# Patient Record
Sex: MALE | Race: WHITE | NOT HISPANIC OR LATINO | Employment: PART TIME | URBAN - METROPOLITAN AREA
[De-identification: names, ages, dates, MRNs, and addresses within clinical notes are randomized per-mention and may not be internally consistent; named-entity substitution may affect disease eponyms.]

---

## 2023-05-09 ENCOUNTER — OFFICE VISIT (OUTPATIENT)
Dept: PRIMARY CARE | Facility: CLINIC | Age: 68
End: 2023-05-09
Payer: COMMERCIAL

## 2023-05-09 VITALS
WEIGHT: 156 LBS | DIASTOLIC BLOOD PRESSURE: 71 MMHG | HEIGHT: 71 IN | BODY MASS INDEX: 21.84 KG/M2 | SYSTOLIC BLOOD PRESSURE: 112 MMHG | HEART RATE: 63 BPM | OXYGEN SATURATION: 97 % | TEMPERATURE: 97.1 F

## 2023-05-09 DIAGNOSIS — K21.9 GASTROESOPHAGEAL REFLUX DISEASE, UNSPECIFIED WHETHER ESOPHAGITIS PRESENT: ICD-10-CM

## 2023-05-09 DIAGNOSIS — L65.9 HAIR LOSS: ICD-10-CM

## 2023-05-09 DIAGNOSIS — Z00.00 HEALTHCARE MAINTENANCE: ICD-10-CM

## 2023-05-09 DIAGNOSIS — R53.83 OTHER FATIGUE: ICD-10-CM

## 2023-05-09 DIAGNOSIS — E55.9 VITAMIN D DEFICIENCY: ICD-10-CM

## 2023-05-09 DIAGNOSIS — Z87.898: ICD-10-CM

## 2023-05-09 DIAGNOSIS — R94.31 ABNORMAL EKG: ICD-10-CM

## 2023-05-09 DIAGNOSIS — K13.0 CHAPPED LIPS: ICD-10-CM

## 2023-05-09 DIAGNOSIS — F41.9 ANXIETY: Primary | ICD-10-CM

## 2023-05-09 DIAGNOSIS — R00.1 BRADYCARDIA: ICD-10-CM

## 2023-05-09 DIAGNOSIS — Z12.5 PROSTATE CANCER SCREENING: ICD-10-CM

## 2023-05-09 DIAGNOSIS — Z91.09 ENVIRONMENTAL ALLERGIES: ICD-10-CM

## 2023-05-09 PROCEDURE — 1159F MED LIST DOCD IN RCRD: CPT | Performed by: STUDENT IN AN ORGANIZED HEALTH CARE EDUCATION/TRAINING PROGRAM

## 2023-05-09 PROCEDURE — 1160F RVW MEDS BY RX/DR IN RCRD: CPT | Performed by: STUDENT IN AN ORGANIZED HEALTH CARE EDUCATION/TRAINING PROGRAM

## 2023-05-09 PROCEDURE — 1036F TOBACCO NON-USER: CPT | Performed by: STUDENT IN AN ORGANIZED HEALTH CARE EDUCATION/TRAINING PROGRAM

## 2023-05-09 PROCEDURE — 99204 OFFICE O/P NEW MOD 45 MIN: CPT | Performed by: STUDENT IN AN ORGANIZED HEALTH CARE EDUCATION/TRAINING PROGRAM

## 2023-05-09 RX ORDER — BUSPIRONE HYDROCHLORIDE 5 MG/1
5 TABLET ORAL 2 TIMES DAILY
Qty: 180 TABLET | Refills: 0 | Status: SHIPPED | OUTPATIENT
Start: 2023-05-09 | End: 2023-06-06 | Stop reason: DRUGHIGH

## 2023-05-09 RX ORDER — FINASTERIDE 1 MG/1
TABLET, FILM COATED ORAL EVERY 24 HOURS
COMMUNITY
End: 2023-07-11 | Stop reason: SDUPTHER

## 2023-05-09 RX ORDER — LORATADINE 10 MG/1
10 TABLET ORAL DAILY
Qty: 30 TABLET | Refills: 2 | COMMUNITY
Start: 2022-06-14 | End: 2022-09-12 | Stop reason: WASHOUT

## 2023-05-09 RX ORDER — BUSPIRONE HYDROCHLORIDE 5 MG/1
TABLET ORAL 2 TIMES DAILY
COMMUNITY
End: 2023-05-09 | Stop reason: SDUPTHER

## 2023-05-09 RX ORDER — PETROLATUM,WHITE
OINTMENT IN PACKET (GRAM) TOPICAL AS NEEDED
Qty: 106 G | Refills: 1 | Status: SHIPPED | OUTPATIENT
Start: 2023-05-09 | End: 2024-04-16 | Stop reason: WASHOUT

## 2023-05-09 ASSESSMENT — ENCOUNTER SYMPTOMS
FEVER: 0
PALPITATIONS: 0
CHILLS: 0
VOMITING: 0
FATIGUE: 0
CONSTIPATION: 0
MYALGIAS: 0
COUGH: 0
SHORTNESS OF BREATH: 0
DIARRHEA: 0
FREQUENCY: 0
HEADACHES: 0
LIGHT-HEADEDNESS: 0
ABDOMINAL PAIN: 0
NAUSEA: 0
RHINORRHEA: 0
ARTHRALGIAS: 0
DIZZINESS: 0

## 2023-05-09 NOTE — PROGRESS NOTES
"Subjective   Patient ID: Wes Sharif is a 67 y.o. male who presents for Establish Care (stress).    HPI     He is here to establish care.  About 3 months ago he started having a lot of increased stress in addition to working multiple jobs.  He had some pain in his chest and decided to get checked out.  He saw another physician. He did not think that there was anything wrong with his heart. EKG normal.  They thought it was a muscle problem along with anxiety.  They thought he also had some anxiety and was started on buspirone.  He has a history of gastric reflux.  He denies any pain currently or any more chest pain.   Sometimes he still feels a heaviness in his chest with increased stress.  He feels better with walking.    He had been seeing a Dr. Katlyn Pelletier with [facility].        Review of Systems   Constitutional:  Negative for chills, fatigue and fever.   HENT:  Negative for congestion and rhinorrhea.    Respiratory:  Negative for cough and shortness of breath.    Cardiovascular:  Negative for chest pain and palpitations.   Gastrointestinal:  Negative for abdominal pain, constipation, diarrhea, nausea and vomiting.   Genitourinary:  Negative for frequency and urgency.   Musculoskeletal:  Negative for arthralgias and myalgias.   Allergic/Immunologic: Positive for environmental allergies. Negative for food allergies.   Neurological:  Negative for dizziness, light-headedness and headaches.       Objective   /71   Pulse 63   Temp 36.2 °C (97.1 °F)   Ht 1.797 m (5' 10.75\")   Wt 70.8 kg (156 lb)   SpO2 97%   BMI 21.91 kg/m²     Physical Exam  Vitals and nursing note reviewed.   Constitutional:       General: He is not in acute distress.     Appearance: Normal appearance. He is normal weight. He is not ill-appearing or toxic-appearing.   Cardiovascular:      Rate and Rhythm: Normal rate and regular rhythm.      Heart sounds: Normal heart sounds.   Pulmonary:      Effort: Pulmonary effort is " normal.      Breath sounds: Normal breath sounds.   Abdominal:      General: Abdomen is flat. Bowel sounds are normal.      Palpations: Abdomen is soft.   Neurological:      Mental Status: He is alert.         Assessment/Plan   Problem List Items Addressed This Visit          Digestive    Gastroesophageal reflux disease    Relevant Orders    CBC and Auto Differential       Other    Anxiety - Primary    Relevant Medications    busPIRone (Buspar) 5 mg tablet    Other Relevant Orders    CBC and Auto Differential    TSH with reflex to Free T4 if abnormal    Vitamin D, Total    Comprehensive Metabolic Panel    Stress Test Only    Hair loss    Relevant Orders    CBC and Auto Differential    Vitamin D, Total    Environmental allergies     Other Visit Diagnoses       Healthcare maintenance        Relevant Medications    busPIRone (Buspar) 5 mg tablet    Other Relevant Orders    CBC and Auto Differential    Prostate Specific Antigen    TSH with reflex to Free T4 if abnormal    Urinalysis with Reflex Microscopic    Vitamin D, Total    Comprehensive Metabolic Panel    Prostate cancer screening        Relevant Orders    Prostate Specific Antigen    History of dull chest pain        Relevant Orders    Stress Test Only    Other fatigue        Relevant Orders    Vitamin D, Total    Stress Test Only    Vitamin D deficiency        Relevant Orders    Vitamin D, Total    Bradycardia        Relevant Orders    Stress Test Only    Abnormal EKG        Relevant Orders    Stress Test Only    Chapped lips        Relevant Medications    white petrolatum (Vaseline) ointment          Patient presenting to establish care.  We will obtain records from prior medical office that he was cared for.  Ordered lab work that the patient can get done prior to his next upcoming appointment.  Reordered patient's buspirone for anxiety and he can continue this current medication.  Also discussed what he can use for chapped lips.  Patient with nonspecific  chest pain.  He denies any current symptoms now.  He had been in the ER with initial cardiac work-up being negative.  Given age and other symptoms, will order stress testing.  We will follow-up for wellness exam after stress testing is performed.  Discussed signs and symptoms that would require reevaluation in the office versus immediate treatment in the emergency department.  He is understanding and in agreement with this plan.

## 2023-05-09 NOTE — PATIENT INSTRUCTIONS
Schedule a medicare wellness exam to be done after your stress test. You will need to call to set this up to be done about 4-5 days after the stress test.  Get labs done before your medicare wellness visit. You will need to fast for 10-12 hours beforehand. You can have water.   Have the records faxed over from [facility]. Or if you pick them up you can have the office make copies to have given to me.  Call with any questions or concerns.    Can also use an over the counter chapstick

## 2023-05-23 ENCOUNTER — TELEPHONE (OUTPATIENT)
Dept: PRIMARY CARE | Facility: CLINIC | Age: 68
End: 2023-05-23
Payer: COMMERCIAL

## 2023-05-23 NOTE — TELEPHONE ENCOUNTER
Pt called to inquire about his stress test results and states you mentioned him also having an Ultrasound and wanted to know about that.    Wes # [phone]

## 2023-05-25 ENCOUNTER — TELEPHONE (OUTPATIENT)
Dept: PRIMARY CARE | Facility: CLINIC | Age: 68
End: 2023-05-25
Payer: COMMERCIAL

## 2023-05-25 DIAGNOSIS — J30.2 SEASONAL ALLERGIES: Primary | ICD-10-CM

## 2023-05-26 RX ORDER — FLUTICASONE PROPIONATE 50 MCG
1 SPRAY, SUSPENSION (ML) NASAL DAILY
Qty: 16 G | Refills: 1 | Status: SHIPPED | OUTPATIENT
Start: 2023-05-26 | End: 2023-11-13 | Stop reason: SDUPTHER

## 2023-05-30 ENCOUNTER — TELEPHONE (OUTPATIENT)
Dept: PRIMARY CARE | Facility: CLINIC | Age: 68
End: 2023-05-30
Payer: COMMERCIAL

## 2023-05-30 NOTE — TELEPHONE ENCOUNTER
Patient calling to verify his buspirone medication, pt states he was previously taking two 15mg tablet daily; you had prescribed him to take two 5mg tablets daily; do you want pt to decrease this medication from 30mg a day to 10 mg a day    Please advise pt at [phone]

## 2023-06-05 ENCOUNTER — LAB (OUTPATIENT)
Dept: LAB | Facility: LAB | Age: 68
End: 2023-06-05
Payer: COMMERCIAL

## 2023-06-05 DIAGNOSIS — K21.9 GASTROESOPHAGEAL REFLUX DISEASE, UNSPECIFIED WHETHER ESOPHAGITIS PRESENT: ICD-10-CM

## 2023-06-05 DIAGNOSIS — E55.9 VITAMIN D DEFICIENCY: ICD-10-CM

## 2023-06-05 DIAGNOSIS — F41.9 ANXIETY: ICD-10-CM

## 2023-06-05 DIAGNOSIS — Z00.00 HEALTHCARE MAINTENANCE: ICD-10-CM

## 2023-06-05 DIAGNOSIS — L65.9 HAIR LOSS: ICD-10-CM

## 2023-06-05 DIAGNOSIS — Z12.5 PROSTATE CANCER SCREENING: ICD-10-CM

## 2023-06-05 DIAGNOSIS — R53.83 OTHER FATIGUE: ICD-10-CM

## 2023-06-05 LAB
ALANINE AMINOTRANSFERASE (SGPT) (U/L) IN SER/PLAS: 27 U/L (ref 10–52)
ALBUMIN (G/DL) IN SER/PLAS: 4.5 G/DL (ref 3.4–5)
ALKALINE PHOSPHATASE (U/L) IN SER/PLAS: 62 U/L (ref 33–136)
ANION GAP IN SER/PLAS: 10 MMOL/L (ref 10–20)
APPEARANCE, URINE: CLEAR
ASPARTATE AMINOTRANSFERASE (SGOT) (U/L) IN SER/PLAS: 24 U/L (ref 9–39)
BASOPHILS (10*3/UL) IN BLOOD BY AUTOMATED COUNT: 0.02 X10E9/L (ref 0–0.1)
BASOPHILS/100 LEUKOCYTES IN BLOOD BY AUTOMATED COUNT: 0.3 % (ref 0–2)
BILIRUBIN TOTAL (MG/DL) IN SER/PLAS: 1 MG/DL (ref 0–1.2)
BILIRUBIN, URINE: NEGATIVE
BLOOD, URINE: NEGATIVE
CALCIDIOL (25 OH VITAMIN D3) (NG/ML) IN SER/PLAS: 34 NG/ML
CALCIUM (MG/DL) IN SER/PLAS: 9.4 MG/DL (ref 8.6–10.6)
CARBON DIOXIDE, TOTAL (MMOL/L) IN SER/PLAS: 32 MMOL/L (ref 21–32)
CHLORIDE (MMOL/L) IN SER/PLAS: 106 MMOL/L (ref 98–107)
COLOR, URINE: YELLOW
CREATININE (MG/DL) IN SER/PLAS: 1.42 MG/DL (ref 0.5–1.3)
EOSINOPHILS (10*3/UL) IN BLOOD BY AUTOMATED COUNT: 0.11 X10E9/L (ref 0–0.7)
EOSINOPHILS/100 LEUKOCYTES IN BLOOD BY AUTOMATED COUNT: 1.8 % (ref 0–6)
ERYTHROCYTE DISTRIBUTION WIDTH (RATIO) BY AUTOMATED COUNT: 13.1 % (ref 11.5–14.5)
ERYTHROCYTE MEAN CORPUSCULAR HEMOGLOBIN CONCENTRATION (G/DL) BY AUTOMATED: 32.7 G/DL (ref 32–36)
ERYTHROCYTE MEAN CORPUSCULAR VOLUME (FL) BY AUTOMATED COUNT: 91 FL (ref 80–100)
ERYTHROCYTES (10*6/UL) IN BLOOD BY AUTOMATED COUNT: 5.23 X10E12/L (ref 4.5–5.9)
GFR MALE: 54 ML/MIN/1.73M2
GLUCOSE (MG/DL) IN SER/PLAS: 95 MG/DL (ref 74–99)
GLUCOSE, URINE: NEGATIVE MG/DL
HEMATOCRIT (%) IN BLOOD BY AUTOMATED COUNT: 47.7 % (ref 41–52)
HEMOGLOBIN (G/DL) IN BLOOD: 15.6 G/DL (ref 13.5–17.5)
IMMATURE GRANULOCYTES/100 LEUKOCYTES IN BLOOD BY AUTOMATED COUNT: 0.2 % (ref 0–0.9)
KETONES, URINE: NEGATIVE MG/DL
LEUKOCYTE ESTERASE, URINE: NEGATIVE
LEUKOCYTES (10*3/UL) IN BLOOD BY AUTOMATED COUNT: 6.1 X10E9/L (ref 4.4–11.3)
LYMPHOCYTES (10*3/UL) IN BLOOD BY AUTOMATED COUNT: 1.76 X10E9/L (ref 1.2–4.8)
LYMPHOCYTES/100 LEUKOCYTES IN BLOOD BY AUTOMATED COUNT: 28.9 % (ref 13–44)
MONOCYTES (10*3/UL) IN BLOOD BY AUTOMATED COUNT: 0.51 X10E9/L (ref 0.1–1)
MONOCYTES/100 LEUKOCYTES IN BLOOD BY AUTOMATED COUNT: 8.4 % (ref 2–10)
NEUTROPHILS (10*3/UL) IN BLOOD BY AUTOMATED COUNT: 3.69 X10E9/L (ref 1.2–7.7)
NEUTROPHILS/100 LEUKOCYTES IN BLOOD BY AUTOMATED COUNT: 60.4 % (ref 40–80)
NITRITE, URINE: NEGATIVE
NRBC (PER 100 WBCS) BY AUTOMATED COUNT: 0 /100 WBC (ref 0–0)
PH, URINE: 7 (ref 5–8)
PLATELETS (10*3/UL) IN BLOOD AUTOMATED COUNT: 214 X10E9/L (ref 150–450)
POTASSIUM (MMOL/L) IN SER/PLAS: 4.5 MMOL/L (ref 3.5–5.3)
PROSTATE SPECIFIC AG (NG/ML) IN SER/PLAS: 0.46 NG/ML (ref 0–4)
PROTEIN TOTAL: 6.7 G/DL (ref 6.4–8.2)
PROTEIN, URINE: NEGATIVE MG/DL
SODIUM (MMOL/L) IN SER/PLAS: 143 MMOL/L (ref 136–145)
SPECIFIC GRAVITY, URINE: 1.02 (ref 1–1.03)
THYROTROPIN (MIU/L) IN SER/PLAS BY DETECTION LIMIT <= 0.05 MIU/L: 1.62 MIU/L (ref 0.44–3.98)
UREA NITROGEN (MG/DL) IN SER/PLAS: 23 MG/DL (ref 6–23)
UROBILINOGEN, URINE: <2 MG/DL (ref 0–1.9)

## 2023-06-05 PROCEDURE — 82306 VITAMIN D 25 HYDROXY: CPT

## 2023-06-05 PROCEDURE — 81003 URINALYSIS AUTO W/O SCOPE: CPT

## 2023-06-05 PROCEDURE — 85025 COMPLETE CBC W/AUTO DIFF WBC: CPT

## 2023-06-05 PROCEDURE — 36415 COLL VENOUS BLD VENIPUNCTURE: CPT

## 2023-06-05 PROCEDURE — 80053 COMPREHEN METABOLIC PANEL: CPT

## 2023-06-05 PROCEDURE — 84443 ASSAY THYROID STIM HORMONE: CPT

## 2023-06-05 PROCEDURE — 84153 ASSAY OF PSA TOTAL: CPT

## 2023-06-06 ENCOUNTER — OFFICE VISIT (OUTPATIENT)
Dept: PRIMARY CARE | Facility: CLINIC | Age: 68
End: 2023-06-06
Payer: COMMERCIAL

## 2023-06-06 VITALS
HEIGHT: 71 IN | HEART RATE: 68 BPM | SYSTOLIC BLOOD PRESSURE: 92 MMHG | BODY MASS INDEX: 22.12 KG/M2 | DIASTOLIC BLOOD PRESSURE: 59 MMHG | TEMPERATURE: 97.1 F | OXYGEN SATURATION: 95 % | WEIGHT: 158 LBS

## 2023-06-06 DIAGNOSIS — Z00.00 MEDICARE ANNUAL WELLNESS VISIT, INITIAL: ICD-10-CM

## 2023-06-06 DIAGNOSIS — Z13.89 ENCOUNTER FOR SCREENING FOR OTHER DISORDER: ICD-10-CM

## 2023-06-06 DIAGNOSIS — Z00.00 ROUTINE GENERAL MEDICAL EXAMINATION AT HEALTH CARE FACILITY: Primary | ICD-10-CM

## 2023-06-06 DIAGNOSIS — Z00.00 HEALTHCARE MAINTENANCE: ICD-10-CM

## 2023-06-06 DIAGNOSIS — Z71.89 ADVANCE DIRECTIVE DISCUSSED WITH PATIENT: ICD-10-CM

## 2023-06-06 DIAGNOSIS — K21.9 GASTROESOPHAGEAL REFLUX DISEASE, UNSPECIFIED WHETHER ESOPHAGITIS PRESENT: ICD-10-CM

## 2023-06-06 DIAGNOSIS — H61.22 IMPACTED CERUMEN OF LEFT EAR: ICD-10-CM

## 2023-06-06 DIAGNOSIS — Z71.89 CARDIAC RISK COUNSELING: ICD-10-CM

## 2023-06-06 DIAGNOSIS — R79.89 ELEVATED SERUM CREATININE: ICD-10-CM

## 2023-06-06 DIAGNOSIS — F41.9 ANXIETY: ICD-10-CM

## 2023-06-06 PROCEDURE — 1036F TOBACCO NON-USER: CPT | Performed by: STUDENT IN AN ORGANIZED HEALTH CARE EDUCATION/TRAINING PROGRAM

## 2023-06-06 PROCEDURE — 1170F FXNL STATUS ASSESSED: CPT | Performed by: STUDENT IN AN ORGANIZED HEALTH CARE EDUCATION/TRAINING PROGRAM

## 2023-06-06 PROCEDURE — 99397 PER PM REEVAL EST PAT 65+ YR: CPT | Performed by: STUDENT IN AN ORGANIZED HEALTH CARE EDUCATION/TRAINING PROGRAM

## 2023-06-06 PROCEDURE — 1159F MED LIST DOCD IN RCRD: CPT | Performed by: STUDENT IN AN ORGANIZED HEALTH CARE EDUCATION/TRAINING PROGRAM

## 2023-06-06 PROCEDURE — G0439 PPPS, SUBSEQ VISIT: HCPCS | Performed by: STUDENT IN AN ORGANIZED HEALTH CARE EDUCATION/TRAINING PROGRAM

## 2023-06-06 PROCEDURE — 1160F RVW MEDS BY RX/DR IN RCRD: CPT | Performed by: STUDENT IN AN ORGANIZED HEALTH CARE EDUCATION/TRAINING PROGRAM

## 2023-06-06 PROCEDURE — 1158F ADVNC CARE PLAN TLK DOCD: CPT | Performed by: STUDENT IN AN ORGANIZED HEALTH CARE EDUCATION/TRAINING PROGRAM

## 2023-06-06 RX ORDER — BUSPIRONE HYDROCHLORIDE 10 MG/1
5 TABLET ORAL 2 TIMES DAILY
Qty: 120 TABLET | Refills: 0 | Status: SHIPPED | OUTPATIENT
Start: 2023-06-06 | End: 2023-07-11 | Stop reason: SDUPTHER

## 2023-06-06 ASSESSMENT — ENCOUNTER SYMPTOMS
ABDOMINAL PAIN: 0
COLOR CHANGE: 0
FATIGUE: 0
SORE THROAT: 0
MYALGIAS: 0
OCCASIONAL FEELINGS OF UNSTEADINESS: 0
LOSS OF SENSATION IN FEET: 0
DYSURIA: 0
ARTHRALGIAS: 0
CONSTIPATION: 0
DIZZINESS: 0
COUGH: 0
RHINORRHEA: 0
PALPITATIONS: 0
DYSPHORIC MOOD: 0
NERVOUS/ANXIOUS: 0
FREQUENCY: 0
DEPRESSION: 0
LIGHT-HEADEDNESS: 0
NAUSEA: 0
FEVER: 0
CHILLS: 0
NUMBNESS: 0
DIARRHEA: 0
VOMITING: 0
SHORTNESS OF BREATH: 0

## 2023-06-06 ASSESSMENT — ACTIVITIES OF DAILY LIVING (ADL)
BATHING: INDEPENDENT
DRESSING: INDEPENDENT
MANAGING_FINANCES: INDEPENDENT
DOING_HOUSEWORK: INDEPENDENT
TAKING_MEDICATION: INDEPENDENT
GROCERY_SHOPPING: INDEPENDENT

## 2023-06-06 ASSESSMENT — PATIENT HEALTH QUESTIONNAIRE - PHQ9
1. LITTLE INTEREST OR PLEASURE IN DOING THINGS: NOT AT ALL
2. FEELING DOWN, DEPRESSED OR HOPELESS: NOT AT ALL
SUM OF ALL RESPONSES TO PHQ9 QUESTIONS 1 AND 2: 0

## 2023-06-06 NOTE — ACP (ADVANCE CARE PLANNING)
Advance Care Planning Note     Discussion Date: 06/06/23   Discussion Participants: patient    The patient wishes to discuss Advance Care Planning today and the following is a brief summary of our discussion.     Patient has capacity to make their own medical decisions: Yes  Health Care Agent/Surrogate Decision Maker documented in chart: No    Documents on file and valid:  Advance Directive/Living Will: No   Health Care Power of : No  Other: Friend: Ariadne Bolden    Communication of Medical Status/Prognosis:   Fair     Communication of Treatment Goals/Options:   Quality of life     Treatment Decisions      Time Statement: Total face to face time spent on advance care planning was 16 minutes with 8 minutes spent in counseling, including the explanation.    Taj Gee DO  6/6/2023 3:09 PM

## 2023-06-06 NOTE — PROGRESS NOTES
"Subjective   Reason for Visit: Wes Sharif is an 67 y.o. male here for a Medicare Wellness visit.     Past Medical, Surgical, and Family History reviewed and updated in chart.    Reviewed all medications by prescribing practitioner or clinical pharmacist (such as prescriptions, OTCs, herbal therapies and supplements) and documented in the medical record.    HPI    Dental: 3-4 times a year.  Vision: Glasses. Yearly    Last Colonoscopy: Cologuard done about a year ago. Negative. Good for 3 years.  AAA Screening: Not indicated  Low Dose Lung CT Screening: Not indicated.    Influenza: Usually gets them yearly. Recommended.  Tetanus: 5 years ago.  Pneumonia: Does not think that he received a pneumonia vaccine. He got shots.   COVID: First 2 and then a booster.  Shingles: About 5 years ago for his green card.       Patient Care Team:  Tja Gee DO as PCP - General (Family Medicine)     Review of Systems   Constitutional:  Negative for chills, fatigue and fever.   HENT:  Negative for congestion, rhinorrhea and sore throat.    Eyes:  Negative for visual disturbance.   Respiratory:  Negative for cough and shortness of breath.    Cardiovascular:  Negative for chest pain and palpitations.   Gastrointestinal:  Negative for abdominal pain, constipation, diarrhea, nausea and vomiting.   Genitourinary:  Negative for dysuria and frequency.   Musculoskeletal:  Negative for arthralgias and myalgias.   Skin:  Negative for color change and rash.   Neurological:  Negative for dizziness, light-headedness and numbness.   Psychiatric/Behavioral:  Negative for dysphoric mood. The patient is not nervous/anxious.        Objective   Vitals:  BP 92/59   Pulse 68   Temp 36.2 °C (97.1 °F)   Ht 1.797 m (5' 10.75\")   Wt 71.7 kg (158 lb)   SpO2 95%   BMI 22.19 kg/m²       Physical Exam  Vitals and nursing note reviewed.   Constitutional:       General: He is not in acute distress.     Appearance: Normal appearance. He is normal " weight. He is not ill-appearing or toxic-appearing.   HENT:      Head: Normocephalic and atraumatic.      Right Ear: Tympanic membrane, ear canal and external ear normal.      Left Ear: Tympanic membrane, ear canal and external ear normal.      Nose: Nose normal.      Mouth/Throat:      Mouth: Mucous membranes are moist.      Pharynx: Oropharynx is clear.   Eyes:      Extraocular Movements: Extraocular movements intact.      Conjunctiva/sclera: Conjunctivae normal.      Pupils: Pupils are equal, round, and reactive to light.   Cardiovascular:      Rate and Rhythm: Normal rate and regular rhythm.      Pulses: Normal pulses.      Heart sounds: Normal heart sounds.   Pulmonary:      Effort: Pulmonary effort is normal.      Breath sounds: Normal breath sounds.   Abdominal:      General: Abdomen is flat. Bowel sounds are normal.      Palpations: Abdomen is soft.   Musculoskeletal:         General: Normal range of motion.      Cervical back: Normal range of motion and neck supple.   Skin:     General: Skin is warm and dry.   Neurological:      General: No focal deficit present.      Mental Status: He is alert and oriented to person, place, and time. Mental status is at baseline.      Cranial Nerves: No cranial nerve deficit.      Sensory: No sensory deficit.      Motor: No weakness.   Psychiatric:         Mood and Affect: Mood normal.         Behavior: Behavior normal.         Thought Content: Thought content normal.         Judgment: Judgment normal.         Assessment/Plan   Problem List Items Addressed This Visit          Digestive    Gastroesophageal reflux disease       Other    Anxiety    Relevant Medications    busPIRone (Buspar) 10 mg tablet    Medicare annual wellness visit, initial - Primary     Other Visit Diagnoses       Healthcare maintenance        Relevant Medications    busPIRone (Buspar) 10 mg tablet    Cardiac risk counseling        Encounter for screening for other disorder        Advance directive  discussed with patient        Elevated serum creatinine        Relevant Orders    Comprehensive Metabolic Panel    Albumin , Urine Random    Hemoglobin A1C

## 2023-06-06 NOTE — PATIENT INSTRUCTIONS
Take the vitamins with food.    Get labs done downstairs on 7/3/23. We will call with results.    You can schedule a nurse visit for the Prevnar 20 pneumonia vaccine for the same day you get the labs done.

## 2023-07-03 ENCOUNTER — CLINICAL SUPPORT (OUTPATIENT)
Dept: PRIMARY CARE | Facility: CLINIC | Age: 68
End: 2023-07-03
Payer: COMMERCIAL

## 2023-07-03 ENCOUNTER — LAB (OUTPATIENT)
Dept: LAB | Facility: LAB | Age: 68
End: 2023-07-03
Payer: COMMERCIAL

## 2023-07-03 DIAGNOSIS — Z23 ENCOUNTER FOR IMMUNIZATION: ICD-10-CM

## 2023-07-03 DIAGNOSIS — R79.89 ELEVATED SERUM CREATININE: ICD-10-CM

## 2023-07-03 LAB
ALANINE AMINOTRANSFERASE (SGPT) (U/L) IN SER/PLAS: 26 U/L (ref 10–52)
ALBUMIN (G/DL) IN SER/PLAS: 4.7 G/DL (ref 3.4–5)
ALBUMIN (MG/L) IN URINE: <7 MG/L
ALBUMIN/CREATININE (UG/MG) IN URINE: ABNORMAL UG/MG CRT (ref 0–30)
ALKALINE PHOSPHATASE (U/L) IN SER/PLAS: 61 U/L (ref 33–136)
ANION GAP IN SER/PLAS: 13 MMOL/L (ref 10–20)
ASPARTATE AMINOTRANSFERASE (SGOT) (U/L) IN SER/PLAS: 24 U/L (ref 9–39)
BILIRUBIN TOTAL (MG/DL) IN SER/PLAS: 1.2 MG/DL (ref 0–1.2)
CALCIUM (MG/DL) IN SER/PLAS: 9.4 MG/DL (ref 8.6–10.6)
CARBON DIOXIDE, TOTAL (MMOL/L) IN SER/PLAS: 30 MMOL/L (ref 21–32)
CHLORIDE (MMOL/L) IN SER/PLAS: 102 MMOL/L (ref 98–107)
CREATININE (MG/DL) IN SER/PLAS: 1.43 MG/DL (ref 0.5–1.3)
CREATININE (MG/DL) IN URINE: 13.3 MG/DL (ref 20–370)
ESTIMATED AVERAGE GLUCOSE FOR HBA1C: 111 MG/DL
GFR MALE: 54 ML/MIN/1.73M2
GLUCOSE (MG/DL) IN SER/PLAS: 96 MG/DL (ref 74–99)
HEMOGLOBIN A1C/HEMOGLOBIN TOTAL IN BLOOD: 5.5 %
POTASSIUM (MMOL/L) IN SER/PLAS: 4.1 MMOL/L (ref 3.5–5.3)
PROTEIN TOTAL: 6.9 G/DL (ref 6.4–8.2)
SODIUM (MMOL/L) IN SER/PLAS: 141 MMOL/L (ref 136–145)
UREA NITROGEN (MG/DL) IN SER/PLAS: 18 MG/DL (ref 6–23)

## 2023-07-03 PROCEDURE — 36415 COLL VENOUS BLD VENIPUNCTURE: CPT

## 2023-07-03 PROCEDURE — 83036 HEMOGLOBIN GLYCOSYLATED A1C: CPT

## 2023-07-03 PROCEDURE — 82570 ASSAY OF URINE CREATININE: CPT

## 2023-07-03 PROCEDURE — G0009 ADMIN PNEUMOCOCCAL VACCINE: HCPCS | Performed by: FAMILY MEDICINE

## 2023-07-03 PROCEDURE — 80053 COMPREHEN METABOLIC PANEL: CPT

## 2023-07-03 PROCEDURE — 90677 PCV20 VACCINE IM: CPT | Performed by: FAMILY MEDICINE

## 2023-07-03 PROCEDURE — 82043 UR ALBUMIN QUANTITATIVE: CPT

## 2023-07-03 NOTE — PROGRESS NOTES
Pt presents for Prevnar 20 vaccine per Dr Gee. 0.5 mL given IM in L delt; no issues w/ injection. Pt tolerated well.

## 2023-07-10 ENCOUNTER — TELEPHONE (OUTPATIENT)
Dept: PRIMARY CARE | Facility: CLINIC | Age: 68
End: 2023-07-10
Payer: COMMERCIAL

## 2023-07-10 DIAGNOSIS — Z00.00 HEALTHCARE MAINTENANCE: ICD-10-CM

## 2023-07-10 DIAGNOSIS — F41.9 ANXIETY: ICD-10-CM

## 2023-07-10 DIAGNOSIS — L65.9 HAIR LOSS: Primary | ICD-10-CM

## 2023-07-10 NOTE — TELEPHONE ENCOUNTER
1) Pt calling in regards to his buspirone medication; pt states he is feeling about 98% better; pt would like to know if he is to continue this medication, 10 mg in the morning and 10mg at night; pt is leaving for [place] on 7/16/23; he will need a refill if you want him to stay on the buspirone sent to [facility], he will need a 90 day supply since he will be in [place]      2) Finasteride 1mg 1qd #90  Dexter Weathers [phone]       Problem: Patient Care Overview  Goal: Plan of Care Review  Outcome: Ongoing (interventions implemented as appropriate)   07/08/18 5836   Coping/Psychosocial   Plan of Care Reviewed With patient   OTHER   Outcome Summary patient worked on sitting balance and sit to stand transfers with mod assist

## 2023-07-11 RX ORDER — BUSPIRONE HYDROCHLORIDE 10 MG/1
5 TABLET ORAL 2 TIMES DAILY
Qty: 180 TABLET | Refills: 0 | Status: SHIPPED | OUTPATIENT
Start: 2023-07-11 | End: 2024-04-16 | Stop reason: WASHOUT

## 2023-07-11 RX ORDER — FINASTERIDE 1 MG/1
1 TABLET, FILM COATED ORAL DAILY
Qty: 90 TABLET | Refills: 1 | Status: SHIPPED | OUTPATIENT
Start: 2023-07-11 | End: 2024-01-21 | Stop reason: SDUPTHER

## 2023-09-07 ENCOUNTER — OFFICE VISIT (OUTPATIENT)
Dept: PRIMARY CARE | Facility: CLINIC | Age: 68
End: 2023-09-07
Payer: COMMERCIAL

## 2023-09-07 VITALS
OXYGEN SATURATION: 97 % | WEIGHT: 168 LBS | DIASTOLIC BLOOD PRESSURE: 71 MMHG | HEIGHT: 71 IN | HEART RATE: 69 BPM | BODY MASS INDEX: 23.52 KG/M2 | TEMPERATURE: 97.4 F | SYSTOLIC BLOOD PRESSURE: 113 MMHG

## 2023-09-07 DIAGNOSIS — G89.29 CHRONIC PAIN OF LEFT HAND: ICD-10-CM

## 2023-09-07 DIAGNOSIS — M79.642 CHRONIC PAIN OF LEFT HAND: ICD-10-CM

## 2023-09-07 DIAGNOSIS — D22.9 ATYPICAL NEVI: Primary | ICD-10-CM

## 2023-09-07 PROCEDURE — 99213 OFFICE O/P EST LOW 20 MIN: CPT | Performed by: STUDENT IN AN ORGANIZED HEALTH CARE EDUCATION/TRAINING PROGRAM

## 2023-09-07 PROCEDURE — 1159F MED LIST DOCD IN RCRD: CPT | Performed by: STUDENT IN AN ORGANIZED HEALTH CARE EDUCATION/TRAINING PROGRAM

## 2023-09-07 PROCEDURE — 1160F RVW MEDS BY RX/DR IN RCRD: CPT | Performed by: STUDENT IN AN ORGANIZED HEALTH CARE EDUCATION/TRAINING PROGRAM

## 2023-09-07 PROCEDURE — 1036F TOBACCO NON-USER: CPT | Performed by: STUDENT IN AN ORGANIZED HEALTH CARE EDUCATION/TRAINING PROGRAM

## 2023-09-07 ASSESSMENT — ENCOUNTER SYMPTOMS
COUGH: 0
SHORTNESS OF BREATH: 0
NAUSEA: 0
VOMITING: 0
FATIGUE: 0
COLOR CHANGE: 1
ABDOMINAL PAIN: 0
RHINORRHEA: 0
ARTHRALGIAS: 0
CHILLS: 0
CONSTIPATION: 0
MYALGIAS: 0
DIARRHEA: 0
DYSURIA: 0
LIGHT-HEADEDNESS: 0
FEVER: 0
HEADACHES: 0
PALPITATIONS: 0
DIZZINESS: 0

## 2023-09-07 NOTE — PATIENT INSTRUCTIONS
We have placed 2 different referrals for you today.    1 referral to dermatology to take a look at the area on your arm.  It also sounds like you had a couple areas looked at and removed in the past.  It might not be a bad idea to get established to have them take a look at the area in question as well as for possible regular skin checks.    With the chronic pain and flaring up of symptoms especially with increased piano use, I did put in a referral for physical therapy for general hand strengthening techniques.  If anything changes and you start having any numbness or tingling or weakness in that hand, I would need to see you again in the office for possible evaluation with an EMG study to make sure that there is no carpal tunnel.    Please call with any additional questions or concerns.    Thank you

## 2023-09-07 NOTE — PROGRESS NOTES
"Subjective   Patient ID: Wes Sharif is a 67 y.o. male who presents for spot on arm (Right forearm,  getting bigger ).    HPI     He is overall doing well.  He has a spot on his right forearm that he wanted checked out.  He is not exactly sure if it has been changing.  He says he thinks like it has been a little bit thicker but is not exactly sure as he had not really been paying too much attention for before in the past.  He does have a history of having a couple different skin lesions removed by dermatology previously.  He is also wondering if he should be seen for regular skin checks.    Patient is also been having some chronic pain of his left hand.  He states it gets worse with activity such as him playing the piano or other hobbies.  It has flared up in the past and he has had it treated in [place].  He also had acupuncture a couple of months ago and is wondering if there is something else that could be done in the meantime for helping to strengthen it.  He denies any weakness or numbness or tingling in the hand.    Review of Systems   Constitutional:  Negative for chills, fatigue and fever.   HENT:  Negative for congestion and rhinorrhea.    Respiratory:  Negative for cough and shortness of breath.    Cardiovascular:  Negative for chest pain and palpitations.   Gastrointestinal:  Negative for abdominal pain, constipation, diarrhea, nausea and vomiting.   Genitourinary:  Negative for dysuria.   Musculoskeletal:  Negative for arthralgias and myalgias.   Skin:  Positive for color change. Negative for pallor and rash.   Neurological:  Negative for dizziness, light-headedness and headaches.       Objective   /71   Pulse 69   Temp 36.3 °C (97.4 °F)   Ht 1.797 m (5' 10.75\")   Wt 76.2 kg (168 lb)   SpO2 97%   BMI 23.60 kg/m²     Physical Exam  Vitals and nursing note reviewed.   Constitutional:       General: He is not in acute distress.     Appearance: Normal appearance. He is normal weight. He is not " ill-appearing or toxic-appearing.   Cardiovascular:      Rate and Rhythm: Normal rate and regular rhythm.      Heart sounds: Normal heart sounds.   Pulmonary:      Effort: Pulmonary effort is normal.      Breath sounds: Normal breath sounds.   Abdominal:      General: Bowel sounds are normal.      Palpations: Abdomen is soft.   Neurological:      Mental Status: He is alert.         Assessment/Plan   Problem List Items Addressed This Visit    None  Visit Diagnoses       Atypical nevi    -  Primary    Relevant Orders    Referral to Dermatology    Chronic pain of left hand        Relevant Orders    Referral to Physical Therapy          History and physical examination as above.  Referral placed to dermatology for atypical nevi as well as patient interested in regular skin exams as he has had other areas removed by dermatology previously.  Patient will call back in the next week if he does not hear back from getting scheduled with dermatology.  Patient with chronic pain of the left hand.  No numbness or tingling or weakness.  Flares up with regular activities including playing the piano and other hobbies.  Referral placed to physical therapy for now.  Discussed that if any of the symptoms worsen, patient would likely need a referral for an EMG study done to rule out carpal tunnel.  He is understanding and in agreement with this plan.

## 2023-10-02 DIAGNOSIS — G89.29 CHRONIC PAIN OF LEFT HAND: ICD-10-CM

## 2023-10-02 DIAGNOSIS — M79.642 CHRONIC PAIN OF LEFT HAND: ICD-10-CM

## 2023-10-15 PROBLEM — R94.31 ABNORMAL EKG: Status: ACTIVE | Noted: 2023-10-15

## 2023-10-15 PROBLEM — R00.1 BRADYCARDIA: Status: ACTIVE | Noted: 2023-10-15

## 2023-10-15 PROBLEM — R53.83 OTHER FATIGUE: Status: ACTIVE | Noted: 2023-10-15

## 2023-10-15 PROBLEM — Z87.898: Status: ACTIVE | Noted: 2023-10-15

## 2023-10-15 RX ORDER — SODIUM FLUORIDE1.1%, POTASSIUM NITRATE 5% 5.8; 57.5 MG/ML; MG/ML
GEL, DENTIFRICE DENTAL
COMMUNITY
Start: 2023-06-13

## 2023-10-15 RX ORDER — OMEPRAZOLE 20 MG/1
20 CAPSULE, DELAYED RELEASE ORAL
COMMUNITY
Start: 2020-12-06 | End: 2024-04-16 | Stop reason: SDUPTHER

## 2023-10-15 RX ORDER — GUAIFENESIN 600 MG/1
TABLET, EXTENDED RELEASE ORAL
COMMUNITY

## 2023-10-17 ENCOUNTER — EVALUATION (OUTPATIENT)
Dept: OCCUPATIONAL THERAPY | Facility: CLINIC | Age: 68
End: 2023-10-17
Payer: COMMERCIAL

## 2023-10-17 DIAGNOSIS — G89.29 CHRONIC PAIN OF LEFT HAND: ICD-10-CM

## 2023-10-17 DIAGNOSIS — M79.642 CHRONIC PAIN OF LEFT HAND: ICD-10-CM

## 2023-10-17 PROCEDURE — 97165 OT EVAL LOW COMPLEX 30 MIN: CPT | Mod: GO

## 2023-10-17 PROCEDURE — 97110 THERAPEUTIC EXERCISES: CPT | Mod: GO

## 2023-10-17 PROCEDURE — 97035 APP MDLTY 1+ULTRASOUND EA 15: CPT | Mod: GO

## 2023-10-17 ASSESSMENT — ENCOUNTER SYMPTOMS
LOSS OF SENSATION IN FEET: 0
OCCASIONAL FEELINGS OF UNSTEADINESS: 0
DEPRESSION: 0

## 2023-10-17 ASSESSMENT — PAIN DESCRIPTION - DESCRIPTORS: DESCRIPTORS: SORE;RADIATING

## 2023-10-17 ASSESSMENT — PAIN - FUNCTIONAL ASSESSMENT: PAIN_FUNCTIONAL_ASSESSMENT: 0-10

## 2023-10-17 ASSESSMENT — PAIN SCALES - GENERAL: PAINLEVEL_OUTOF10: 3

## 2023-10-17 NOTE — PROGRESS NOTES
Occupational Therapy Evaluation    Patient Name: Wes Sharif  MRN: [id number]  Today's Date: 10/17/2023  Time Calculation  Start Time: 1500  Stop Time: 1550  Time Calculation (min): 50 min    Subjective   Current Problem:  Pt arrives with pain in L hand going up to shoulder.  Pain:  Pain Assessment  Pain Assessment: 0-10  Pain Score: 3  Pain Type: Chronic pain  Pain Location: Hand  Pain Orientation: Left  Pain Radiating Towards: Elbow to hand or hand to elbow (Pt unsure)  Pain Descriptors: Sore, Radiating  Pain Frequency: Intermittent  Pain Onset: Ongoing  Patient's Stated Pain Goal: No pain  Objective     Special Tests Tapping negative         Precautions:  Precautions  STEADI Fall Risk Score (The score of 4 or more indicates an increased risk of falling): 0    General Assessments:  Hand Function  Gross Grasp: Functional (R  102# L 90#)  Extremity Assessments:  RUE AROM (degrees)  R Wrist Flexion 0-80: 75 Degrees  R Wrist Extension 0-70: 70 Degrees  RUE Strength  RUE Overall Strength: Within Functional Limits - strength 5/5  LUE AROM (degrees)  L Wrist Flexion 0-80: 75 Degrees  L Wrist Extension 0-70: 80 Degrees  LUE Strength  LUE Overall Strength: Within Functional Limits - strength 5/5    TREATMENT  Therapeutic Exercise  Therapeutic Exercise Performed: Yes  Therapeutic Exercise Activity 1: Upgraded HEP (tendon glides)  Therapeutic Exercise Activity 2: HEP (wrist extensor and flexor stretches)  Therapeutic Exercise Activity 3: HEP (Doorway stretch)    Manual Therapy  Manual Therapy Performed: Yes  Manual Therapy Activity 1: Applied Kinesiotape from shoulder to wrist (Using two Y cuts for inhibition)    Other Activity  Other Activity Performed: Yes  Other Activity 1: Ultrasound 0.8 w/cm2, 50%, to dorsal hand and wrist/forearm     Assessment/Plan Pt demonstrated all TE well.  Pt was encouraged to go slow with playing piano in short bursts now.     Plan  OT Plan: TE, TA, US, Manual  Duration: 6 weeks    Active        OT Goals       Pt will report pain less than 2/10 with playing piano       Start:  10/17/23    Expected End:  01/09/24            Pt will be able to return to piano and guitar playing intermittently.       Start:  10/17/23    Expected End:  01/09/24               OT Problem       PATIENT WILL DEMONSTRATE INDEPENDENCE IN HOME PROGRAM FOR SUPPORT OF PROGRESSION       Start:  10/17/23    Expected End:  01/09/24

## 2023-10-17 NOTE — LETTER
October 17, 2023     Patient: Wes Sharif   YOB: 1955   Date of Visit: 10/17/2023       To Whom it May Concern:    Wes Sharif was seen in my clinic on 10/17/2023. He {Return to school/sport:39830}.    If you have any questions or concerns, please don't hesitate to call.         Sincerely,          Efren Escalera, OT        CC: No Recipients

## 2023-10-17 NOTE — LETTER
October 17, 2023     Patient: Wes Sharif   YOB: 1955   Date of Visit: 10/17/2023       To Whom It May Concern:    It is my medical opinion that Wes Sharif {Work release (duty restriction):88393}.    If you have any questions or concerns, please don't hesitate to call.         Sincerely,        Efren Escalera, OT    CC: No Recipients

## 2023-10-19 ENCOUNTER — TREATMENT (OUTPATIENT)
Dept: OCCUPATIONAL THERAPY | Facility: CLINIC | Age: 68
End: 2023-10-19
Payer: COMMERCIAL

## 2023-10-19 DIAGNOSIS — G89.29 CHRONIC PAIN OF LEFT HAND: ICD-10-CM

## 2023-10-19 DIAGNOSIS — M79.642 CHRONIC PAIN OF LEFT HAND: ICD-10-CM

## 2023-10-19 PROCEDURE — 97110 THERAPEUTIC EXERCISES: CPT | Mod: GO

## 2023-10-19 PROCEDURE — 97035 APP MDLTY 1+ULTRASOUND EA 15: CPT | Mod: GO

## 2023-10-19 ASSESSMENT — PAIN - FUNCTIONAL ASSESSMENT: PAIN_FUNCTIONAL_ASSESSMENT: 0-10

## 2023-10-19 ASSESSMENT — PAIN SCALES - GENERAL: PAINLEVEL_OUTOF10: 0 - NO PAIN

## 2023-10-19 NOTE — PROGRESS NOTES
OOccupational Therapy Treatment    Patient Name: Wes Sharif  MRN: [id number]  Today's Date: 10/19/2023  Time Calculation  Start Time: 1300  Stop Time: 1340  Time Calculation (min): 40 min    Subjective   Current Problem:  Pt stated he had a lot of pain the day after OT but now he is good.   Pain:  Pain Assessment  Pain Assessment: 0-10  Pain Score: 0 - No pain    Objective   Treatment:  Therapeutic Exercise  Therapeutic Exercise Performed: Yes  Therapeutic Exercise Activity 1: Added sponge with tendon glides (start with 5reps and increase as tolerated to 10 reps two times daily.)  Therapeutic Exercise Activity 2: Power web (intrinsic  and extrinsic wrist stretches x 5 reps each)  Therapeutic Exercise Activity 3: TE in fluidotherapy (hand and wrist ROM)      Other Activity  Other Activity Performed: Yes  Other Activity 1: Ultrasound 0.8 w/cm2, 50%, to dorsal hand and wrist/forearm      Assessment/Plan Pt had some pain after tx last session.  Pt has been doing his HEP and playing piano 10 min at a time and is feeling better.     Plan  OT Plan: TE, TA, US, Manual    GOALS:  Active       OT Goals       Pt will report pain less than 2/10 with playing piano       Start:  10/17/23    Expected End:  01/09/24            Pt will be able to return to piano and guitar playing intermittently.       Start:  10/17/23    Expected End:  01/09/24               OT Problem       PATIENT WILL DEMONSTRATE INDEPENDENCE IN HOME PROGRAM FOR SUPPORT OF PROGRESSION       Start:  10/17/23    Expected End:  01/09/24

## 2023-10-24 ENCOUNTER — TREATMENT (OUTPATIENT)
Dept: OCCUPATIONAL THERAPY | Facility: CLINIC | Age: 68
End: 2023-10-24
Payer: COMMERCIAL

## 2023-10-24 DIAGNOSIS — G89.29 CHRONIC PAIN OF LEFT HAND: ICD-10-CM

## 2023-10-24 DIAGNOSIS — M79.642 CHRONIC PAIN OF LEFT HAND: ICD-10-CM

## 2023-10-24 PROCEDURE — 97140 MANUAL THERAPY 1/> REGIONS: CPT | Mod: GO

## 2023-10-24 PROCEDURE — 97035 APP MDLTY 1+ULTRASOUND EA 15: CPT | Mod: GO

## 2023-10-24 PROCEDURE — 97530 THERAPEUTIC ACTIVITIES: CPT | Mod: GO

## 2023-10-24 PROCEDURE — L3908 WHO COCK-UP NONMOLDE PRE OTS: HCPCS

## 2023-10-24 ASSESSMENT — PAIN - FUNCTIONAL ASSESSMENT: PAIN_FUNCTIONAL_ASSESSMENT: 0-10

## 2023-10-24 ASSESSMENT — PAIN SCALES - GENERAL: PAINLEVEL_OUTOF10: 4

## 2023-10-24 NOTE — PROGRESS NOTES
"Occupational Therapy Treatment    Patient Name: Wes Sharif  MRN: [id number]  Today's Date: 10/24/2023  Time Calculation  Start Time: 1445  Stop Time: 1530  Time Calculation (min): 45 min    Subjective   Current Problem:  Pt states he had a bad day again. \"I did too much.\"   Pain:  Pain Assessment  Pain Assessment: 0-10  Pain Score: 4  Pain Location: Hand  Pain Orientation: Left    Objective   Treatment:  Therapeutic Exercise  Therapeutic Exercise Performed: Yes  Therapeutic Exercise Activity 1: TE while in fluidotherapy (wrist ext/flex, RD/UD and sup/pro as well as fisting.)    Therapeutic Activity  Therapeutic Activity Performed: Yes  Therapeutic Activity 1: Issued neoprene wrist wrap for L hand/wrist (wear/care precautions provided.)    Manual Therapy  Manual Therapy Performed: Yes  Manual Therapy Activity 1: Applied kinesiotape to radial side using inhibitory correction and space correction on ulnar side.    Other Activity  Other Activity Performed: Yes  Other Activity 1: Ultrasound 0.8 w/cm2, 50%, to ulnar side and radial side more volarly.    Assessment/Plan Pt needs to rest for a few days and return to TE on Thursday.  Recommended he go slow. Pt to use wrist wrap as desired.     Plan  OT Plan: TE, TA, US, Manual    GOALS:  Active       OT Goals       Pt will report pain less than 2/10 with playing piano       Start:  10/17/23    Expected End:  01/09/24            Pt will be able to return to piano and guitar playing intermittently.       Start:  10/17/23    Expected End:  01/09/24               OT Problem       PATIENT WILL DEMONSTRATE INDEPENDENCE IN HOME PROGRAM FOR SUPPORT OF PROGRESSION       Start:  10/17/23    Expected End:  01/09/24              "

## 2023-10-25 ENCOUNTER — TELEPHONE (OUTPATIENT)
Dept: PRIMARY CARE | Facility: CLINIC | Age: 68
End: 2023-10-25
Payer: COMMERCIAL

## 2023-10-25 NOTE — TELEPHONE ENCOUNTER
Patient calling would like to know if you would recommend him getting the flu shot    Please advise pt at [phone]

## 2023-10-26 NOTE — TELEPHONE ENCOUNTER
Left message to let patient know doctor is recommending a flu shot and he should call the office to be put on the nurse schedule

## 2023-11-02 ENCOUNTER — TREATMENT (OUTPATIENT)
Dept: OCCUPATIONAL THERAPY | Facility: CLINIC | Age: 68
End: 2023-11-02
Payer: COMMERCIAL

## 2023-11-02 DIAGNOSIS — M79.642 CHRONIC PAIN OF LEFT HAND: ICD-10-CM

## 2023-11-02 DIAGNOSIS — G89.29 CHRONIC PAIN OF LEFT HAND: ICD-10-CM

## 2023-11-02 PROCEDURE — 97035 APP MDLTY 1+ULTRASOUND EA 15: CPT | Mod: GO

## 2023-11-02 PROCEDURE — 97110 THERAPEUTIC EXERCISES: CPT | Mod: GO

## 2023-11-02 PROCEDURE — 97022 WHIRLPOOL THERAPY: CPT | Mod: GO

## 2023-11-02 ASSESSMENT — PAIN SCALES - GENERAL: PAINLEVEL_OUTOF10: 0 - NO PAIN

## 2023-11-02 ASSESSMENT — PAIN - FUNCTIONAL ASSESSMENT: PAIN_FUNCTIONAL_ASSESSMENT: 0-10

## 2023-11-02 NOTE — PROGRESS NOTES
Occupational Therapy Treatment    Patient Name: Wes Sharif  MRN: [id number]  Today's Date: 11/2/2023  Time Calculation  Start Time: 1445  Stop Time: 1515  Time Calculation (min): 30 min    Subjective   Current Problem:  Pt states he has done nothing for a week because he has pain when he does activity.   Pain:  Pain Assessment  Pain Assessment: 0-10  Pain Score: 0 - No pain  Pain Location: Hand  Pain Orientation: Left    Objective     Treatment:  Therapeutic Exercise  Therapeutic Exercise Performed: Yes  Therapeutic Exercise Activity 1: Recommended to pt to completed tendon glides, thumb opposition and light  and pinch with sponge only at this time.      Other Activity  Other Activity Performed: Yes  Other Activity 1: Ultrasound 0.8 w/cm2, 50%, to more volarly side near CTS area.  Other Activity 2: Fluidotherapy with AROM before ultrasound.    Assessment/Plan      Plan  OT Plan: TE, TA, US, Manual    GOALS:  Active       OT Goals       Pt will report pain less than 2/10 with playing piano       Start:  10/17/23    Expected End:  01/09/24            Pt will be able to return to piano and guitar playing intermittently.       Start:  10/17/23    Expected End:  01/09/24               OT Problem       PATIENT WILL DEMONSTRATE INDEPENDENCE IN HOME PROGRAM FOR SUPPORT OF PROGRESSION       Start:  10/17/23    Expected End:  01/09/24

## 2023-11-03 ENCOUNTER — CLINICAL SUPPORT (OUTPATIENT)
Dept: PRIMARY CARE | Facility: CLINIC | Age: 68
End: 2023-11-03
Payer: COMMERCIAL

## 2023-11-03 DIAGNOSIS — Z23 NEED FOR IMMUNIZATION AGAINST INFLUENZA: ICD-10-CM

## 2023-11-03 PROCEDURE — G0008 ADMIN INFLUENZA VIRUS VAC: HCPCS | Performed by: STUDENT IN AN ORGANIZED HEALTH CARE EDUCATION/TRAINING PROGRAM

## 2023-11-03 PROCEDURE — 90662 IIV NO PRSV INCREASED AG IM: CPT | Performed by: STUDENT IN AN ORGANIZED HEALTH CARE EDUCATION/TRAINING PROGRAM

## 2023-11-03 NOTE — PROGRESS NOTES
Pt presents for annual flu vaccine per Dr Gee. 0.7 mL given IM in R delt; no issues w/ injection. Pt tolerated well.

## 2023-11-07 ENCOUNTER — TREATMENT (OUTPATIENT)
Dept: OCCUPATIONAL THERAPY | Facility: CLINIC | Age: 68
End: 2023-11-07
Payer: COMMERCIAL

## 2023-11-07 DIAGNOSIS — G89.29 CHRONIC PAIN OF LEFT HAND: Primary | ICD-10-CM

## 2023-11-07 DIAGNOSIS — M79.642 CHRONIC PAIN OF LEFT HAND: Primary | ICD-10-CM

## 2023-11-07 PROCEDURE — 97022 WHIRLPOOL THERAPY: CPT | Mod: GO

## 2023-11-07 PROCEDURE — 97110 THERAPEUTIC EXERCISES: CPT | Mod: GO

## 2023-11-07 PROCEDURE — 97035 APP MDLTY 1+ULTRASOUND EA 15: CPT | Mod: GO

## 2023-11-07 ASSESSMENT — PAIN - FUNCTIONAL ASSESSMENT: PAIN_FUNCTIONAL_ASSESSMENT: 0-10

## 2023-11-07 ASSESSMENT — PAIN SCALES - GENERAL: PAINLEVEL_OUTOF10: 2

## 2023-11-07 NOTE — PROGRESS NOTES
Occupational Therapy Treatment    Patient Name: Wes Sharif  MRN: [id number]  Today's Date: 11/7/2023  Time Calculation  Start Time: 1615  Stop Time: 1705  Time Calculation (min): 50 min    Subjective   Current Problem:  Pt states he has not played piano for 5 days and he feels so much better.   Pain:  Pain Assessment  Pain Assessment: 0-10  Pain Score: 2  Pain Location: Hand  Pain Orientation: Left  Pain Descriptors:  (fatigue)    Objective      Treatment:  Therapeutic Exercise  Therapeutic Exercise Performed: Yes  Therapeutic Exercise Activity 1: Digi flex red individual and composite 5 and 15 reps x 2 sets  Therapeutic Exercise Activity 2: Green digi flex individual and composite 5 and 15 reps  Therapeutic Exercise Activity 3: Power web for  x 5 reps x 2 sets  Therapeutic Exercise Activity 4: Power web for wrist ext and flex stretching x 5 reps each         Manual Therapy  Manual Therapy Performed: Yes  Manual Therapy Activity 1: STM and retrograde massage performed on L hand down wrist.    Other Activity  Other Activity Performed: Yes  Other Activity 1: Ultrasound 0.8 w/cm2, 50%, to more volarly side near CTS area.  Other Activity 2: Fluidotherapy with AROM before ultrasound.    Assessment/Plan Pt progressing nicely.  If pt continues with low to no pain will advance with strengthening.     Plan  OT Plan: Progress to putty    GOALS:  Active       OT Goals       Pt will report pain less than 2/10 with playing piano       Start:  10/17/23    Expected End:  01/09/24            Pt will be able to return to piano and guitar playing intermittently.       Start:  10/17/23    Expected End:  01/09/24               OT Problem       PATIENT WILL DEMONSTRATE INDEPENDENCE IN HOME PROGRAM FOR SUPPORT OF PROGRESSION       Start:  10/17/23    Expected End:  01/09/24

## 2023-11-09 ENCOUNTER — APPOINTMENT (OUTPATIENT)
Dept: OCCUPATIONAL THERAPY | Facility: CLINIC | Age: 68
End: 2023-11-09
Payer: COMMERCIAL

## 2023-11-13 ENCOUNTER — TELEPHONE (OUTPATIENT)
Dept: PRIMARY CARE | Facility: CLINIC | Age: 68
End: 2023-11-13
Payer: COMMERCIAL

## 2023-11-13 DIAGNOSIS — J30.2 SEASONAL ALLERGIES: ICD-10-CM

## 2023-11-13 NOTE — TELEPHONE ENCOUNTER
Pt needs a refill on   fluticasone (Flonase) 50 mcg/actuation nasal spray     [facility] pharmacy [facility]

## 2023-11-14 RX ORDER — FLUTICASONE PROPIONATE 50 MCG
1 SPRAY, SUSPENSION (ML) NASAL DAILY
Qty: 16 G | Refills: 1 | Status: SHIPPED | OUTPATIENT
Start: 2023-11-14 | End: 2024-01-08

## 2023-11-16 ENCOUNTER — TREATMENT (OUTPATIENT)
Dept: OCCUPATIONAL THERAPY | Facility: CLINIC | Age: 68
End: 2023-11-16
Payer: COMMERCIAL

## 2023-11-16 DIAGNOSIS — M79.642 CHRONIC PAIN OF LEFT HAND: ICD-10-CM

## 2023-11-16 DIAGNOSIS — G89.29 CHRONIC PAIN OF LEFT HAND: ICD-10-CM

## 2023-11-16 PROCEDURE — 97022 WHIRLPOOL THERAPY: CPT | Mod: GO

## 2023-11-16 PROCEDURE — 97035 APP MDLTY 1+ULTRASOUND EA 15: CPT | Mod: GO

## 2023-11-16 PROCEDURE — 97110 THERAPEUTIC EXERCISES: CPT | Mod: GO

## 2023-11-16 ASSESSMENT — PAIN SCALES - GENERAL: PAINLEVEL_OUTOF10: 2

## 2023-11-16 ASSESSMENT — PAIN - FUNCTIONAL ASSESSMENT: PAIN_FUNCTIONAL_ASSESSMENT: 0-10

## 2023-11-16 NOTE — PROGRESS NOTES
Occupational Therapy Treatment    Patient Name: Wes Sharif  MRN: [id number]  Today's Date: 11/16/2023  Time Calculation  Start Time: 1450  Stop Time: 1540  Time Calculation (min): 50 min    Subjective   Current Problem:  Pt states the resting has been helping a great deal.   Pain:  Pain Assessment  Pain Assessment: 0-10  Pain Score: 2  Pain Location: Hand  Pain Orientation: Left    Objective   Treatment:  Therapeutic Exercise  Therapeutic Exercise Performed: Yes  Therapeutic Exercise Activity 1: Upgraded HEP with forearm/wrist PRE's including wrist ext/flex, RD/UD and sup/pro.  Pt completed 10 reps each and one time per day.  Therapeutic Exercise Activity 2: Reviewed with pt what HEP should look like, i.e. tendon glides, wrist stretches, wrist PRE's and  with sponge.  Therapeutic Exercise Activity 3: Recommend pt return to scales on piano as part of his HEP up to 5 minutes at a time.    Other Activity  Other Activity Performed: Yes  Other Activity 1: Ultrasound 0.8 w/cm2, 50%, to more volarly side near CTS area.  Other Activity 2: Fluidotherapy with AROM before ultrasound.    Assessment/Plan Pt making progress with reducing pain and ability to perform resistive exercises. Pt needs a lot of review of HEP.  He is very precise in his learning style.     Plan  OT Plan: Progress to putty next session. (Progressed to PRE's this session.)    GOALS:  Active       OT Goals       Pt will report pain less than 2/10 with playing piano       Start:  10/17/23    Expected End:  01/09/24            Pt will be able to return to piano and guitar playing intermittently.       Start:  10/17/23    Expected End:  01/09/24               OT Problem       PATIENT WILL DEMONSTRATE INDEPENDENCE IN HOME PROGRAM FOR SUPPORT OF PROGRESSION       Start:  10/17/23    Expected End:  01/09/24

## 2023-11-20 ENCOUNTER — TREATMENT (OUTPATIENT)
Dept: OCCUPATIONAL THERAPY | Facility: CLINIC | Age: 68
End: 2023-11-20
Payer: COMMERCIAL

## 2023-11-20 DIAGNOSIS — G89.29 CHRONIC PAIN OF LEFT HAND: ICD-10-CM

## 2023-11-20 DIAGNOSIS — M79.642 CHRONIC PAIN OF LEFT HAND: ICD-10-CM

## 2023-11-20 PROCEDURE — 97022 WHIRLPOOL THERAPY: CPT | Mod: GO

## 2023-11-20 PROCEDURE — 97035 APP MDLTY 1+ULTRASOUND EA 15: CPT | Mod: GO

## 2023-11-20 PROCEDURE — 97110 THERAPEUTIC EXERCISES: CPT | Mod: GO

## 2023-11-20 ASSESSMENT — PAIN SCALES - GENERAL: PAINLEVEL_OUTOF10: 1

## 2023-11-20 ASSESSMENT — PAIN - FUNCTIONAL ASSESSMENT: PAIN_FUNCTIONAL_ASSESSMENT: 0-10

## 2023-11-20 NOTE — PROGRESS NOTES
Occupational Therapy Treatment    Patient Name: Wes Sharif  MRN: [id number]  Today's Date: 11/20/2023  Time Calculation  Start Time: 1500  Stop Time: 1545  Time Calculation (min): 45 min    Subjective   Current Problem:  Pt states he feels like he is on his way to recovery.   Pain:  Pain Assessment  Pain Assessment: 0-10  Pain Score: 1  Pain Location: Hand  Pain Orientation: Left    Objective   Treatment:  Therapeutic Exercise  Therapeutic Exercise Performed: Yes  Therapeutic Exercise Activity 1: Upgraded HEP with theraputty for full, flat and hook , 3 jaw, lateral and tip pinch as well as finger extension.  Issued yellow putty. (Pt returned demonstration of all exercises.)    Other Activity  Other Activity Performed: Yes  Other Activity 1: Ultrasound 0.8 w/cm2, 50%, to more volarly side near CTS area.    Assessment/Plan  Pt does have significantly less pain that previous sessions.     Plan  OT Plan: Monitor pt    GOALS:  Active       OT Goals       Pt will report pain less than 2/10 with playing piano       Start:  10/17/23    Expected End:  01/09/24            Pt will be able to return to piano and guitar playing intermittently.       Start:  10/17/23    Expected End:  01/09/24               OT Problem       PATIENT WILL DEMONSTRATE INDEPENDENCE IN HOME PROGRAM FOR SUPPORT OF PROGRESSION       Start:  10/17/23    Expected End:  01/09/24

## 2023-11-21 ENCOUNTER — APPOINTMENT (OUTPATIENT)
Dept: OCCUPATIONAL THERAPY | Facility: CLINIC | Age: 68
End: 2023-11-21
Payer: COMMERCIAL

## 2023-11-21 ENCOUNTER — TREATMENT (OUTPATIENT)
Dept: OCCUPATIONAL THERAPY | Facility: CLINIC | Age: 68
End: 2023-11-21
Payer: COMMERCIAL

## 2023-11-21 DIAGNOSIS — M79.642 CHRONIC PAIN OF LEFT HAND: Primary | ICD-10-CM

## 2023-11-21 DIAGNOSIS — G89.29 CHRONIC PAIN OF LEFT HAND: Primary | ICD-10-CM

## 2023-11-21 PROCEDURE — 97035 APP MDLTY 1+ULTRASOUND EA 15: CPT | Mod: GO

## 2023-11-21 PROCEDURE — 97022 WHIRLPOOL THERAPY: CPT | Mod: GO

## 2023-11-21 PROCEDURE — 97110 THERAPEUTIC EXERCISES: CPT | Mod: GO

## 2023-11-21 ASSESSMENT — PAIN SCALES - GENERAL: PAINLEVEL_OUTOF10: 0 - NO PAIN

## 2023-11-21 ASSESSMENT — PAIN - FUNCTIONAL ASSESSMENT: PAIN_FUNCTIONAL_ASSESSMENT: 0-10

## 2023-11-21 NOTE — PROGRESS NOTES
Occupational Therapy Treatment    Patient Name: Wes Sharif  MRN: [id number]  Today's Date: 11/21/2023  Time Calculation  Start Time: 1700  Stop Time: 1740  Time Calculation (min): 40 min    Subjective   Current Problem:  Pt arrives to clinic stating he is doing so well.   Pain:  Pain Assessment  Pain Assessment: 0-10  Pain Score: 0 - No pain  Pain Location: Hand  Pain Orientation: Left    Objective   Treatment:  Therapeutic Exercise  Therapeutic Exercise Performed: Yes  Therapeutic Exercise Activity 1: Reviewed theraputty with pt including full, flat and hook , 3 jaw and lateral pinch and finger extension with yellow putty. (Pt returned demonstration of all independently.  Recommended to pt to not do again until the next day.)    Other Activity  Other Activity Performed: Yes  Other Activity 1: Ultrasound 0.8 w/cm2, 50%, to more volarly side near CTS area.  Other Activity 2: Fluidotherapy with AROM before ultrasound.      Assessment/Plan Pt making good progress with reduction in pain.  Encouraged pt to continue on the slow path to resuming his piano playing.     Plan  OT Plan: Progress as able.    GOALS:  Active       OT Goals       Pt will report pain less than 2/10 with playing piano       Start:  10/17/23    Expected End:  01/09/24            Pt will be able to return to piano and guitar playing intermittently.       Start:  10/17/23    Expected End:  01/09/24               OT Problem       PATIENT WILL DEMONSTRATE INDEPENDENCE IN HOME PROGRAM FOR SUPPORT OF PROGRESSION       Start:  10/17/23    Expected End:  01/09/24

## 2023-11-27 ENCOUNTER — TREATMENT (OUTPATIENT)
Dept: OCCUPATIONAL THERAPY | Facility: CLINIC | Age: 68
End: 2023-11-27
Payer: COMMERCIAL

## 2023-11-27 DIAGNOSIS — M79.642 CHRONIC PAIN OF LEFT HAND: Primary | ICD-10-CM

## 2023-11-27 DIAGNOSIS — G89.29 CHRONIC PAIN OF LEFT HAND: Primary | ICD-10-CM

## 2023-11-27 PROCEDURE — 97110 THERAPEUTIC EXERCISES: CPT | Mod: GO

## 2023-11-27 PROCEDURE — 97035 APP MDLTY 1+ULTRASOUND EA 15: CPT | Mod: GO

## 2023-11-27 PROCEDURE — 97022 WHIRLPOOL THERAPY: CPT | Mod: GO

## 2023-11-27 ASSESSMENT — PAIN SCALES - GENERAL: PAINLEVEL_OUTOF10: 2

## 2023-11-27 ASSESSMENT — PAIN - FUNCTIONAL ASSESSMENT: PAIN_FUNCTIONAL_ASSESSMENT: 0-10

## 2023-11-27 NOTE — PROGRESS NOTES
Occupational Therapy Treatment    Patient Name: Wes Sharif  MRN: [id number]  Today's Date: 11/27/2023  Time Calculation  Start Time: 1455  Stop Time: 1530  Time Calculation (min): 35 min    Subjective   Current Problem:  Pt states he has been doing good.  Just yesterday he gripped the putty and felt it in his wrist. (Also pt late to appointment- he lost his phone.)  Pain:  Pain Assessment  Pain Assessment: 0-10  Pain Score: 2  Pain Location: Hand  Pain Orientation: Left    Objective      Treatment:  Therapeutic Exercise  Therapeutic Exercise Performed: Yes  Therapeutic Exercise Activity 1: Digi flex green for individual and composite flexion. (No pain.)  Therapeutic Exercise Activity 2: Power web for wrist stretches extensor and flexors. (No pain.)  Therapeutic Exercise Activity 3: Upgraded HEP with putty exercises to encourage lumbrical strengthening and finger adduction strengthening.      Other Activity  Other Activity Performed: Yes  Other Activity 1: Fluidotherapy with AROM before ultrasound.  Other Activity 2: Ultrasound 0.8 w/cm2, 50%, to more volarly side near CTS area.      Assessment/Plan Advised pt that notice how he  if his wrist is in flexion it may hurt.  Encouraged gripping with wrist in extension or at least neutral.     Plan  OT Plan: Measure strength    GOALS:  Active       OT Goals       Pt will report pain less than 2/10 with playing piano       Start:  10/17/23    Expected End:  01/09/24            Pt will be able to return to piano and guitar playing intermittently.       Start:  10/17/23    Expected End:  01/09/24               OT Problem       PATIENT WILL DEMONSTRATE INDEPENDENCE IN HOME PROGRAM FOR SUPPORT OF PROGRESSION       Start:  10/17/23    Expected End:  01/09/24

## 2023-11-30 ENCOUNTER — TREATMENT (OUTPATIENT)
Dept: OCCUPATIONAL THERAPY | Facility: CLINIC | Age: 68
End: 2023-11-30
Payer: COMMERCIAL

## 2023-11-30 DIAGNOSIS — M79.642 CHRONIC PAIN OF LEFT HAND: Primary | ICD-10-CM

## 2023-11-30 DIAGNOSIS — G89.29 CHRONIC PAIN OF LEFT HAND: Primary | ICD-10-CM

## 2023-11-30 NOTE — PROGRESS NOTES
Occupational Therapy                 Therapy Communication Note    Patient Name: Wes Sharif  MRN: [id number]  Today's Date: 11/30/2023     Discipline: Occupational Therapy    Missed Visit Reason:      Missed Time: Cancel    Comment: Not feeling well.

## 2023-12-04 ENCOUNTER — APPOINTMENT (OUTPATIENT)
Dept: OCCUPATIONAL THERAPY | Facility: CLINIC | Age: 68
End: 2023-12-04
Payer: COMMERCIAL

## 2023-12-04 ENCOUNTER — DOCUMENTATION (OUTPATIENT)
Dept: OCCUPATIONAL THERAPY | Facility: CLINIC | Age: 68
End: 2023-12-04
Payer: COMMERCIAL

## 2023-12-04 NOTE — PROGRESS NOTES
Occupational Therapy                 Therapy Communication Note    Patient Name: Wes Sharif  MRN: [id number]  Today's Date: 12/4/2023     Discipline: Occupational Therapy    Missed Visit Reason:      Missed Time: Cancel    Comment:

## 2023-12-05 ENCOUNTER — APPOINTMENT (OUTPATIENT)
Dept: OCCUPATIONAL THERAPY | Facility: CLINIC | Age: 68
End: 2023-12-05
Payer: COMMERCIAL

## 2023-12-05 ENCOUNTER — TREATMENT (OUTPATIENT)
Dept: OCCUPATIONAL THERAPY | Facility: CLINIC | Age: 68
End: 2023-12-05
Payer: COMMERCIAL

## 2023-12-05 ENCOUNTER — TELEPHONE (OUTPATIENT)
Dept: PRIMARY CARE | Facility: CLINIC | Age: 68
End: 2023-12-05
Payer: COMMERCIAL

## 2023-12-05 DIAGNOSIS — G89.29 CHRONIC PAIN OF LEFT HAND: Primary | ICD-10-CM

## 2023-12-05 DIAGNOSIS — M79.642 CHRONIC PAIN OF LEFT HAND: Primary | ICD-10-CM

## 2023-12-05 PROCEDURE — 97035 APP MDLTY 1+ULTRASOUND EA 15: CPT | Mod: GO

## 2023-12-05 PROCEDURE — 97110 THERAPEUTIC EXERCISES: CPT | Mod: GO

## 2023-12-05 PROCEDURE — 97530 THERAPEUTIC ACTIVITIES: CPT | Mod: GO

## 2023-12-05 ASSESSMENT — PAIN - FUNCTIONAL ASSESSMENT: PAIN_FUNCTIONAL_ASSESSMENT: 0-10

## 2023-12-05 ASSESSMENT — PAIN SCALES - GENERAL: PAINLEVEL_OUTOF10: 1

## 2023-12-05 NOTE — PROGRESS NOTES
Occupational Therapy Treatment    Patient Name: Wes Sharif  MRN: [id number]  Today's Date: 12/5/2023  Time Calculation  Start Time: 1620  Stop Time: 1700  Time Calculation (min): 40 min    Subjective   Current Problem:  Pt states his pain just comes and goes.   Pain:  Pain Assessment  Pain Assessment: 0-10  Pain Score: 1  Pain Location: Hand  Pain Orientation: Left    Objective      R 95#  L 85#  Lateral pinch R 15#  L 15#  3 Jaw Pinch R 11#  L 11#  Treatment:  Therapeutic Exercise  Therapeutic Exercise Performed: Yes  Therapeutic Exercise Activity 1: Digi flex with kinesiotape in place to see if it helps prevent pain.  Therapeutic Exercise Activity 2: Issued red putty and recommend light  and pinch versus heavier .      Other Activity  Other Activity Performed: Yes  Other Activity 1: Fluidotherapy with AROM before ultrasound.  Other Activity 2: Ultrasound 0.8 w/cm2, 50%, to more volarly side near CTS area.      Assessment/Plan Pt has good  and pinch, but his sustainability to play an instrument is what is most debilitating.  He has pain in volar mid section of forearm. It is OT hope that with continuing HEP with less intensity will relieve the pain.     Plan  OT Plan: Continue to monitor    GOALS:  Active       OT Goals       Pt will report pain less than 2/10 with playing piano       Start:  10/17/23    Expected End:  01/09/24            Pt will be able to return to piano and guitar playing intermittently.       Start:  10/17/23    Expected End:  01/09/24               OT Problem       PATIENT WILL DEMONSTRATE INDEPENDENCE IN HOME PROGRAM FOR SUPPORT OF PROGRESSION       Start:  10/17/23    Expected End:  01/09/24

## 2023-12-07 ENCOUNTER — TREATMENT (OUTPATIENT)
Dept: OCCUPATIONAL THERAPY | Facility: CLINIC | Age: 68
End: 2023-12-07
Payer: COMMERCIAL

## 2023-12-07 DIAGNOSIS — M79.642 CHRONIC PAIN OF LEFT HAND: ICD-10-CM

## 2023-12-07 DIAGNOSIS — G89.29 CHRONIC PAIN OF LEFT HAND: ICD-10-CM

## 2023-12-07 PROCEDURE — 97140 MANUAL THERAPY 1/> REGIONS: CPT | Mod: GO

## 2023-12-07 PROCEDURE — 97110 THERAPEUTIC EXERCISES: CPT | Mod: GO

## 2023-12-07 PROCEDURE — 97035 APP MDLTY 1+ULTRASOUND EA 15: CPT | Mod: GO

## 2023-12-07 ASSESSMENT — PAIN SCALES - GENERAL: PAINLEVEL_OUTOF10: 0 - NO PAIN

## 2023-12-07 ASSESSMENT — PAIN - FUNCTIONAL ASSESSMENT: PAIN_FUNCTIONAL_ASSESSMENT: 0-10

## 2023-12-07 NOTE — PROGRESS NOTES
"Occupational Therapy Treatment    Patient Name: Wes Sharif  MRN: [id number]  Today's Date: 12/7/2023  Time Calculation  Start Time: 1620  Stop Time: 1705  Time Calculation (min): 45 min    Subjective   Current Problem:  Pt states the tape has helped a lot.  \"I feel so good.\"   Pain:  Pain Assessment  Pain Assessment: 0-10  Pain Score: 0 - No pain  Pain Location: Hand  Pain Orientation: Left    Objective      Treatment:  Therapeutic Exercise  Therapeutic Exercise Performed: Yes  Therapeutic Exercise Activity 1: Reviewed putty exercises with pt by not pushing so hard.  Gentle  both full and flat.    Manual Therapy  Manual Therapy Performed: Yes  Manual Therapy Activity 1: Applied kinesiotape volarly on forearm using inhibitory from mid forearm to wrist.  Then applied a space correction more distal.  Manual Therapy Activity 2: IASTM with Hawk  on volar forearm. (No real tightness observed or felt.)    Other Activity  Other Activity Performed: Yes  Other Activity 1: Fluidotherapy with AROM before ultrasound.  Other Activity 2: Ultrasound 0.8 w/cm2, 50%, to more volarly side near CTS area.    Assessment/Plan Pt finally is consistent with less pain.  Encouraging pt to continue as he has been.  A mix of rest and TE.  Will attempt to increase strengthening next session to see if he can continue with minimal to no pain.     Plan  OT Plan: Increase strengthening exercises.    GOALS:  Active       OT Goals       Pt will report pain less than 2/10 with playing piano       Start:  10/17/23    Expected End:  01/09/24            Pt will be able to return to piano and guitar playing intermittently.       Start:  10/17/23    Expected End:  01/09/24               OT Problem       PATIENT WILL DEMONSTRATE INDEPENDENCE IN HOME PROGRAM FOR SUPPORT OF PROGRESSION       Start:  10/17/23    Expected End:  01/09/24              "

## 2023-12-11 ENCOUNTER — TREATMENT (OUTPATIENT)
Dept: OCCUPATIONAL THERAPY | Facility: CLINIC | Age: 68
End: 2023-12-11
Payer: COMMERCIAL

## 2023-12-11 DIAGNOSIS — G89.29 CHRONIC PAIN OF LEFT HAND: Primary | ICD-10-CM

## 2023-12-11 DIAGNOSIS — G89.29 CHRONIC PAIN OF RIGHT HAND: ICD-10-CM

## 2023-12-11 DIAGNOSIS — M79.641 CHRONIC PAIN OF RIGHT HAND: ICD-10-CM

## 2023-12-11 DIAGNOSIS — M79.642 CHRONIC PAIN OF LEFT HAND: Primary | ICD-10-CM

## 2023-12-11 PROCEDURE — 97022 WHIRLPOOL THERAPY: CPT | Mod: GO

## 2023-12-11 PROCEDURE — 97530 THERAPEUTIC ACTIVITIES: CPT | Mod: GO

## 2023-12-11 ASSESSMENT — PAIN SCALES - GENERAL: PAINLEVEL_OUTOF10: 0 - NO PAIN

## 2023-12-11 ASSESSMENT — PAIN - FUNCTIONAL ASSESSMENT: PAIN_FUNCTIONAL_ASSESSMENT: 0-10

## 2023-12-11 NOTE — PROGRESS NOTES
Occupational Therapy Treatment    Patient Name: Wes Sharif  MRN: [id number]  Today's Date: 12/11/2023  Time Calculation  Start Time: 1450  Stop Time: 1530  Time Calculation (min): 40 min    Subjective   Current Problem:  Pt states he has no pain and actually feels stronger than his right hand now.   Pain:  Pain Assessment  Pain Assessment: 0-10  Pain Score: 0 - No pain  Pain Location: Hand  Pain Orientation: Left    Objective      Splinting and Casting: Recommended to pt to use neoprene wrist wrap that was initially issued for R wrist and use on L wrist. It is universal.     Treatment:  Therapeutic Activity  Therapeutic Activity Performed: Yes  Therapeutic Activity 1: With using large amount of putty pt rolled out with rolling pin for weight bearing.  Therapeutic Activity 2: Then used a cone on the putty for resistend wrist ext/flex.  Therapeutic Activity 3: Finally completed activity with pinch/ of putty.  Therapeutic Activity 4: Power web used to stretch after the intense activity.    Other Activity  Other Activity Performed: Yes  Other Activity 1: Fluidotherapy with AROM before ultrasound.  Other Activity 2: Discontinued ultrasound due to no pain consistently.      Assessment/Plan Pt making good progress with reduction in pain R hand/wrist.     Plan  OT Plan: Continue to increase strength L UE.    GOALS:  Active       OT Goals       Pt will report pain less than 2/10 with playing piano       Start:  10/17/23    Expected End:  01/09/24            Pt will be able to return to piano and guitar playing intermittently.       Start:  10/17/23    Expected End:  01/09/24               OT Problem       PATIENT WILL DEMONSTRATE INDEPENDENCE IN HOME PROGRAM FOR SUPPORT OF PROGRESSION       Start:  10/17/23    Expected End:  01/09/24

## 2023-12-14 ENCOUNTER — TREATMENT (OUTPATIENT)
Dept: OCCUPATIONAL THERAPY | Facility: CLINIC | Age: 68
End: 2023-12-14
Payer: COMMERCIAL

## 2023-12-14 DIAGNOSIS — M79.642 CHRONIC PAIN OF LEFT HAND: ICD-10-CM

## 2023-12-14 DIAGNOSIS — G89.29 CHRONIC PAIN OF LEFT HAND: ICD-10-CM

## 2023-12-14 PROCEDURE — 97168 OT RE-EVAL EST PLAN CARE: CPT | Mod: GO

## 2023-12-14 PROCEDURE — 97022 WHIRLPOOL THERAPY: CPT | Mod: GO

## 2023-12-14 PROCEDURE — 97530 THERAPEUTIC ACTIVITIES: CPT | Mod: GO

## 2023-12-14 ASSESSMENT — PAIN SCALES - WONG BAKER: WONGBAKER_NUMERICALRESPONSE: HURTS LITTLE MORE

## 2023-12-14 ASSESSMENT — PAIN - FUNCTIONAL ASSESSMENT: PAIN_FUNCTIONAL_ASSESSMENT: 0-10

## 2023-12-14 ASSESSMENT — PAIN SCALES - GENERAL
PAINLEVEL_OUTOF10: 0 - NO PAIN
PAINLEVEL_OUTOF10: 0 - NO PAIN

## 2023-12-14 NOTE — PROGRESS NOTES
Occupational Therapy Re Evaluation    Patient Name: Wes Sharif  MRN: [id number]  Today's Date: 12/14/2023  Time Calculation  Start Time: 1615  Stop Time: 1705  Time Calculation (min): 50 min    Subjective   Current Problem:  Pt states he is in more pain on R.  His L is good.  Pain:  Pain Assessment  Pain Assessment: 0-10  Pain Score: 0 - No pain  Pain Location: Hand  Pain Orientation: Left    Objective      General Assessments:  Hand Function  Gross Grasp: Functional ( R 94# L 80#, lateral R 16# L 16#, 3 jaw R 11# R 11#)    TREATMENT   Therapeutic Activity  Therapeutic Activity Performed: Yes  Therapeutic Activity 1: Instructed pt on different writing patterns using a 3 jaw pattern and using built up . (Pt practiced both for quite sometime.  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    Other Activity  Other Activity Performed: Yes  Other Activity 1: Fluidotherapy with AROM B UE.  Other Activity 2: Re instated ultrasound for R hand/wrist 0.8 w/cm2, 50%, 3 mHz to volar forearm R.     Assessment/Plan Pt making good progress with L hand/wrist.  Currently pt experiencing overuse with R UE.  Emailed physician as well as sent a note through pt's chart to add R UE to script.  Included here are new goals for R UE.     OP Plan  OT Plan: Continue OT    Active       OT Goals       Pt will report pain less than 1/10 R hand with writing.       Start:  12/14/23            Pt will be able to perform his job duties with 1/10 pain or less.       Start:  12/14/23               OT Goals       Pt will report pain less than 2/10 with playing piano (Progressing)       Start:  10/17/23    Expected End:  01/09/24            Pt will be able to return to piano and guitar playing intermittently. (Progressing)       Start:  10/17/23    Expected End:  01/09/24                OT Problem       PATIENT WILL DEMONSTRATE INDEPENDENCE IN HOME PROGRAM FOR SUPPORT OF PROGRESSION (Progressing)       Start:  10/17/23    Expected End:  01/09/24

## 2023-12-18 ENCOUNTER — APPOINTMENT (OUTPATIENT)
Dept: OCCUPATIONAL THERAPY | Facility: CLINIC | Age: 68
End: 2023-12-18
Payer: COMMERCIAL

## 2023-12-21 ENCOUNTER — TREATMENT (OUTPATIENT)
Dept: OCCUPATIONAL THERAPY | Facility: CLINIC | Age: 68
End: 2023-12-21
Payer: COMMERCIAL

## 2023-12-21 DIAGNOSIS — M79.642 CHRONIC PAIN OF LEFT HAND: ICD-10-CM

## 2023-12-21 DIAGNOSIS — G89.29 CHRONIC PAIN OF LEFT HAND: ICD-10-CM

## 2023-12-21 DIAGNOSIS — G89.29 CHRONIC PAIN OF RIGHT HAND: ICD-10-CM

## 2023-12-21 DIAGNOSIS — M79.641 CHRONIC PAIN OF RIGHT HAND: ICD-10-CM

## 2023-12-21 PROCEDURE — 97035 APP MDLTY 1+ULTRASOUND EA 15: CPT | Mod: GO

## 2023-12-21 PROCEDURE — 97530 THERAPEUTIC ACTIVITIES: CPT | Mod: GO

## 2023-12-21 ASSESSMENT — PAIN - FUNCTIONAL ASSESSMENT: PAIN_FUNCTIONAL_ASSESSMENT: 0-10

## 2023-12-21 ASSESSMENT — PAIN SCALES - GENERAL
PAINLEVEL_OUTOF10: 0 - NO PAIN
PAINLEVEL_OUTOF10: 0 - NO PAIN

## 2023-12-21 ASSESSMENT — PAIN SCALES - WONG BAKER: WONGBAKER_NUMERICALRESPONSE: NO HURT

## 2023-12-21 NOTE — PROGRESS NOTES
Occupational Therapy Treatment    Patient Name: Wes Sharif  MRN: [id number]  Today's Date: 12/21/2023  Time Calculation  Start Time: 1500  Stop Time: 1540  Time Calculation (min): 40 min    Subjective   Current Problem:  Pt states he is doing very well with both right and left.   Pain:  Pain Assessment  Pain Assessment: 0-10  Pain Score: 0 - No pain  Pain Location: Hand  Pain Orientation: Left    Objective     Splinting and Casting: Pt brought in prefab WHFO that he wears to sleep in.     Treatment:  Therapeutic Activity  Therapeutic Activity Performed: Yes  Therapeutic Activity 1: Instructed pt on how to place kineisotape on himself using inhibitory for volar forearm and space correction at distal wrist. Pt video taped with his phone.  Therapeutic Activity 2: Reviewed with pt increasing reps with putty and with PRE's.  However, recommended pt do one at a time to increase.    Other Activity  Other Activity Performed: Yes  Other Activity 1: Ultrasound 0.8 w/cm2, 3mHz at 50% to volar forearm and thenar eminence for8 minutes.    Assessment/Plan Pt has made good progress. Pt's chart will remain open for at least 30 days if he needs to return. Otherwise pt will be discharged at that time.     OP Plan  OT Plan: Hold therapy for now. (Pt wants to try HEP on his own.)    GOALS:  Active       OT Goals       Pt will report pain less than 1/10 R hand with writing. (Progressing)       Start:  12/14/23            Pt will be able to perform his job duties with 1/10 pain or less. (Progressing)       Start:  12/14/23               OT Goals       Pt will report pain less than 2/10 with playing piano (Met)       Start:  10/17/23    Expected End:  01/09/24    Resolved:  12/21/23         Pt will be able to return to piano and guitar playing intermittently. (Progressing)       Start:  10/17/23    Expected End:  01/09/24               OT Problem       PATIENT WILL DEMONSTRATE INDEPENDENCE IN HOME PROGRAM FOR SUPPORT OF PROGRESSION  (Progressing)       Start:  10/17/23    Expected End:  01/09/24

## 2023-12-26 ENCOUNTER — APPOINTMENT (OUTPATIENT)
Dept: OCCUPATIONAL THERAPY | Facility: CLINIC | Age: 68
End: 2023-12-26
Payer: COMMERCIAL

## 2023-12-28 ENCOUNTER — APPOINTMENT (OUTPATIENT)
Dept: OCCUPATIONAL THERAPY | Facility: CLINIC | Age: 68
End: 2023-12-28
Payer: COMMERCIAL

## 2024-01-02 ENCOUNTER — TELEPHONE (OUTPATIENT)
Dept: PRIMARY CARE | Facility: CLINIC | Age: 69
End: 2024-01-02
Payer: COMMERCIAL

## 2024-01-02 NOTE — TELEPHONE ENCOUNTER
Pt coming in for annual physical on 04/05/24    Bw?    Pt will go downstairs for bw.    Pt phone [phone]

## 2024-01-07 DIAGNOSIS — J30.2 SEASONAL ALLERGIES: ICD-10-CM

## 2024-01-08 RX ORDER — FLUTICASONE PROPIONATE 50 MCG
SPRAY, SUSPENSION (ML) NASAL
Qty: 24 ML | Refills: 2 | Status: SHIPPED | OUTPATIENT
Start: 2024-01-08 | End: 2024-04-16 | Stop reason: WASHOUT

## 2024-01-11 ENCOUNTER — APPOINTMENT (OUTPATIENT)
Dept: OCCUPATIONAL THERAPY | Facility: CLINIC | Age: 69
End: 2024-01-11
Payer: COMMERCIAL

## 2024-01-16 ENCOUNTER — TELEPHONE (OUTPATIENT)
Dept: PRIMARY CARE | Facility: CLINIC | Age: 69
End: 2024-01-16
Payer: COMMERCIAL

## 2024-01-16 DIAGNOSIS — L65.9 HAIR LOSS: ICD-10-CM

## 2024-01-16 DIAGNOSIS — H04.123 DRY EYES: Primary | ICD-10-CM

## 2024-01-16 NOTE — TELEPHONE ENCOUNTER
1) Refill Request  [facility] [phone]  Finasteride 1 mg 1qd #90    2) Pt would also like to know if you could call him in something for  dry eyes to [facility], he has been using refresh tears over the counter but this does not seem to be helping    Best pt contact # [phone]

## 2024-01-21 RX ORDER — CARBOXYMETHYLCELLULOSE SODIUM AND GLYCERIN 5; 9 MG/ML; MG/ML
1 SOLUTION/ DROPS OPHTHALMIC DAILY PRN
Qty: 10 ML | Refills: 0 | Status: SHIPPED | OUTPATIENT
Start: 2024-01-21 | End: 2024-04-16 | Stop reason: WASHOUT

## 2024-01-21 RX ORDER — FINASTERIDE 1 MG/1
1 TABLET, FILM COATED ORAL DAILY
Qty: 90 TABLET | Refills: 1 | Status: SHIPPED | OUTPATIENT
Start: 2024-01-21

## 2024-02-02 ENCOUNTER — PROCEDURE VISIT (OUTPATIENT)
Dept: DERMATOLOGY | Facility: CLINIC | Age: 69
End: 2024-02-02
Payer: COMMERCIAL

## 2024-02-02 DIAGNOSIS — C44.519 BASAL CELL CARCINOMA (BCC) OF LOWER BACK: ICD-10-CM

## 2024-02-02 PROCEDURE — 12032 INTMD RPR S/A/T/EXT 2.6-7.5: CPT | Performed by: STUDENT IN AN ORGANIZED HEALTH CARE EDUCATION/TRAINING PROGRAM

## 2024-02-02 PROCEDURE — 11602 EXC TR-EXT MAL+MARG 1.1-2 CM: CPT | Performed by: STUDENT IN AN ORGANIZED HEALTH CARE EDUCATION/TRAINING PROGRAM

## 2024-02-02 PROCEDURE — 88305 TISSUE EXAM BY PATHOLOGIST: CPT | Performed by: DERMATOLOGY

## 2024-02-02 RX ORDER — ERYTHROMYCIN 5 MG/G
1 OINTMENT OPHTHALMIC 2 TIMES DAILY
COMMUNITY
Start: 2024-02-02

## 2024-02-02 NOTE — PROGRESS NOTES
Excision Operative Note    Date of Surgery:  2/2/2024  Surgeon:  Nando Sales MD  Office Location: [address] [address]  Dept: [phone]  Dept Fax: [phone]  Referring Provider: La Howard MD  [address] [place] [address] [id number] [place] [address]   Wes Sharif is a 68 y.o. male who presents for the following: Excision for basal cell carcinoma.    According to the patient, the lesion has been present for approximately 6 months at the time of diagnosis.  The lesion is not causing symptoms.  The lesion has not been treated previously.    The patient does not have a pacemaker / defibrillator.  The patient does not have a heart valve / joint replacement.    The patient is not on blood thinners.   The patient does not have a history of hepatitis B or C.  The patient does not have a history of HIV.  The patient does not have a history of immunosuppression (e.g. organ transplantation, malignancy, medications)    The following portions of the chart were reviewed this encounter and updated as appropriate:         Assessment/Plan   Pre-procedure:   Obtained informed consent: written from patient  The surgical site was identified with two staff members (MD and nurse) and confirmed with the patient using photographs.     Intra-operative:   Audible time out called at : 3:40 PM 02/02/24  by: SAMMY NEIL RN   Verified patient name, birthdate, site, specimen bottle label & requisition.    The planned procedure(s) was again reviewed with the patient. The risks of bleeding, infection, nerve damage and scarring were reviewed. The patient identity, surgical site, and planned procedure(s) were verified.     Biopsy Accession Number: [id number]  Basal cell carcinoma (BCC) of lower back  Left Paraspinal Lower Back    Skin excision    Lesion length (cm):  1  Lesion width (cm):  0.9  Margin per side (cm):  0.4  Total excision  diameter (cm):  1.8  Informed consent: discussed and consent obtained    Timeout: patient name, date of birth, surgical site, and procedure verified    Procedure prep:  Patient prepped in sterile fashion  Anesthesia: the lesion was anesthetized in a standard fashion    Anesthetic:  1% lidocaine w/ epinephrine 1-100,000 local infiltration  Instrument used: #15 blade    Hemostasis achieved with: electrodesiccation    Outcome: patient tolerated procedure well with no complications    Post-procedure details: sterile dressing applied and wound care instructions given    Dressing type: pressure dressing    Additional details:  The nature of the diagnosis was explained. The lesion is a skin cancer.  It is locally aggressive but has a very low to non-existent risk of spreading.  The condition is associated with sun exposure.  Warning signs of non-melanoma skin cancer discussed. Patient was instructed to perform monthly self skin examination.  We recommended that the patient have regular full skin exams given an increased risk of subsequent skin cancers. The patient was instructed to use sun protective behaviors including use of broad spectrum sunscreens and sun protective clothing to reduce risk of skin cancers.  Excision was discussed with the patient. The risks, benefits and potential adverse effects were reviewed. Discussion included but was not limited to the cure rate, relative cost, wound care requirements, activity restrictions, likely scar outcome and time to heal were reviewed. It was explained that the scar would be longer than the original lesion.  The patient elected to proceed with exicision today.    Skin repair  Complexity:  Intermediate  Final length (cm):  4.3  Informed consent: discussed and consent obtained    Timeout: patient name, date of birth, surgical site, and procedure verified    Procedure prep:  Patient prepped in sterile fashion  Anesthesia: the lesion was anesthetized in a standard fashion     Anesthetic:  1% lidocaine w/ epinephrine 1-100,000 local infiltration  Reason for type of repair: reduce tension to allow closure    Undermining: edges undermined    Subcutaneous layers (deep stitches):   Suture size:  3-0  Suture type: Vicryl (polyglactin 910)    Stitches:  Buried vertical mattress  Fine/surface layer approximation (top stitches):   Suture size:  5-0  Suture type: fast-absorbing plain gut    Stitches: simple running    Hemostasis achieved with: electrodesiccation  Outcome: patient tolerated procedure well with no complications    Post-procedure details: sterile dressing applied and wound care instructions given    Dressing type: pressure dressing      Staff Communication: Dermatology Local Anesthesia: 1 % Lidocaine / Epinephrine - Amount: 10.5cc    Specimen 1 - Dermatopathology- DERM LAB  Differential Diagnosis: BCC, #[id number]  Check Margins Yes/No?:  Yes  Comments:  4mm margins, indication stitch at medial tip  Dermpath Lab: Routine Histopathology (formalin-fixed tissue)      Intermediate Linear Repair:  Given the location and size of the defect, it was determined that an intermediate layered linear closure was required to restore normal anatomy and function. The repair is an intermediate closure as two layers of sutures were required. The defect was undermined extensively at the level of the subcutaneous plane. Standing cutaneous cones were removed using Burow's triangles. The wound edges were brought into close approximation with buried vertical mattress sutures. The remainder of the wound was then closed with epidermal top sutures.    The final repair measured 4.3 cm    Wound care was discussed, and the patient was given written post-operative wound care instructions.      The patient will follow up with Nando Sales MD as needed for any post operative problems or concerns, and will follow up with their primary dermatologist as scheduled.

## 2024-02-05 ENCOUNTER — TELEPHONE (OUTPATIENT)
Dept: DERMATOLOGY | Facility: CLINIC | Age: 69
End: 2024-02-05
Payer: COMMERCIAL

## 2024-02-05 NOTE — TELEPHONE ENCOUNTER
Patients caregiver called in stating that it appears that his operative site from last Friday has dehisced. She said the wound edges are no longer together, but that the wound is not actively bleeding and it looks clean, no evidence of infection. They sent a photo, and we offered to have the patient seen today in our [place] office but he declined. He would rather wait to see us later this week at our [place] office. Dr. Sales spoke with him on the phone and said we will see him Friday, and likely let the wound heal up before re-excising it to make it a thinner scar. Patient is agreeable to this plan, and voiced no other questions or concerns.

## 2024-02-06 DIAGNOSIS — S21.201A OPEN WOUND OF RIGHT SIDE OF BACK, INITIAL ENCOUNTER: Primary | ICD-10-CM

## 2024-02-06 RX ORDER — MUPIROCIN 20 MG/G
OINTMENT TOPICAL DAILY
Qty: 30 G | Refills: 1 | Status: SHIPPED | OUTPATIENT
Start: 2024-02-06 | End: 2024-02-16

## 2024-02-07 LAB
LABORATORY COMMENT REPORT: NORMAL
PATH REPORT.FINAL DX SPEC: NORMAL
PATH REPORT.GROSS SPEC: NORMAL
PATH REPORT.MICROSCOPIC SPEC OTHER STN: NORMAL
PATH REPORT.RELEVANT HX SPEC: NORMAL
PATH REPORT.TOTAL CANCER: NORMAL

## 2024-02-09 ENCOUNTER — OFFICE VISIT (OUTPATIENT)
Dept: DERMATOLOGY | Facility: CLINIC | Age: 69
End: 2024-02-09
Payer: COMMERCIAL

## 2024-02-09 DIAGNOSIS — Z51.89 VISIT FOR WOUND CHECK: ICD-10-CM

## 2024-02-09 PROCEDURE — 99024 POSTOP FOLLOW-UP VISIT: CPT | Performed by: STUDENT IN AN ORGANIZED HEALTH CARE EDUCATION/TRAINING PROGRAM

## 2024-02-09 PROCEDURE — 1159F MED LIST DOCD IN RCRD: CPT | Performed by: STUDENT IN AN ORGANIZED HEALTH CARE EDUCATION/TRAINING PROGRAM

## 2024-02-09 PROCEDURE — 1126F AMNT PAIN NOTED NONE PRSNT: CPT | Performed by: STUDENT IN AN ORGANIZED HEALTH CARE EDUCATION/TRAINING PROGRAM

## 2024-02-09 PROCEDURE — 1036F TOBACCO NON-USER: CPT | Performed by: STUDENT IN AN ORGANIZED HEALTH CARE EDUCATION/TRAINING PROGRAM

## 2024-02-09 NOTE — PROGRESS NOTES
Office Follow Up Note    Visit Summary  Chief Complaint    1. Complaint Wound check.    Wes Sharif is a 68 y.o. male who presents for 1 week follow up after surgery for a basal cell carcinoma. The patient had noted dehiscence of the surgical site earlier this week. He has been applying mupirocin ointment to the wound daily.     Location Operation site location: Left Paraspinal Lower Back    On exam,  Mr. Sharif is well-appearing and in no apparent distress. The surgical site appears clean with minimal to no erythema and no tenderness. The dehisced area centrally has appropriate granulation tissue.     Assessment and Plan:  History of skin cancer requiring ongoing monitoring for recurrence and additional lesion development.   Pathology from the patient's excision showed clear margins and was discussed with the patient today.   Vicryl sutures from the open wound were removed.   The dressing was removed, the wound cleaned a a new dressing reapplied.   The patient was advised to continue wound care with mupirocin and bandaging for 1 month.     The patient was advised on the importance of sun protection and routine skin monitoring and instructed to call with any further concerns. The patient will return in 1 month.

## 2024-02-22 ENCOUNTER — DOCUMENTATION (OUTPATIENT)
Dept: OCCUPATIONAL THERAPY | Facility: CLINIC | Age: 69
End: 2024-02-22
Payer: COMMERCIAL

## 2024-02-22 NOTE — PROGRESS NOTES
Occupational Therapy    Discharge Summary    Name: Wes Sharif  MRN: [id number]  : 1955  Date: 24    Discharge Summary: OT    Discharge Information: Date of discharge 24, Date of last visit 23, Date of evaluation 10/17/23, Number of attended visits 10, Referred by Dr. Gee, and Referred for B hand pain    Therapy Summary: Pt slowly reduced pain and improved function.    Discharge Status: Discharged     Rehab Discharge Reason: Achieved all and/or the most significant goals(s)

## 2024-03-06 ENCOUNTER — APPOINTMENT (OUTPATIENT)
Dept: PRIMARY CARE | Facility: CLINIC | Age: 69
End: 2024-03-06
Payer: COMMERCIAL

## 2024-03-15 ENCOUNTER — APPOINTMENT (OUTPATIENT)
Dept: DERMATOLOGY | Facility: CLINIC | Age: 69
End: 2024-03-15
Payer: COMMERCIAL

## 2024-03-21 DIAGNOSIS — R00.1 BRADYCARDIA: ICD-10-CM

## 2024-03-21 DIAGNOSIS — R53.83 OTHER FATIGUE: ICD-10-CM

## 2024-03-21 DIAGNOSIS — Z00.00 ROUTINE GENERAL MEDICAL EXAMINATION AT A HEALTH CARE FACILITY: ICD-10-CM

## 2024-03-21 DIAGNOSIS — R79.89 ELEVATED SERUM CREATININE: ICD-10-CM

## 2024-03-21 DIAGNOSIS — F41.9 ANXIETY: ICD-10-CM

## 2024-03-21 DIAGNOSIS — Z12.5 PROSTATE CANCER SCREENING: ICD-10-CM

## 2024-03-22 ENCOUNTER — OFFICE VISIT (OUTPATIENT)
Dept: DERMATOLOGY | Facility: CLINIC | Age: 69
End: 2024-03-22
Payer: COMMERCIAL

## 2024-03-22 DIAGNOSIS — S21.201D OPEN WOUND OF RIGHT SIDE OF BACK, SUBSEQUENT ENCOUNTER: ICD-10-CM

## 2024-03-22 PROCEDURE — 99024 POSTOP FOLLOW-UP VISIT: CPT | Performed by: STUDENT IN AN ORGANIZED HEALTH CARE EDUCATION/TRAINING PROGRAM

## 2024-03-22 PROCEDURE — 1036F TOBACCO NON-USER: CPT | Performed by: STUDENT IN AN ORGANIZED HEALTH CARE EDUCATION/TRAINING PROGRAM

## 2024-03-22 PROCEDURE — 1159F MED LIST DOCD IN RCRD: CPT | Performed by: STUDENT IN AN ORGANIZED HEALTH CARE EDUCATION/TRAINING PROGRAM

## 2024-03-22 NOTE — PROGRESS NOTES
Office Follow Up Note    Visit Summary  Chief Complaint    1. Complaint Wound check.    Wes Sharif is a 68 y.o. male who presents for 8 week follow up after surgery for a basal cell carcinoma. The wound was complicated by dehiscence. The patient states the wound has healed well through granulation and has no concerns today.     Location Operation site location: Left Paraspinal Lower Back    On exam,  Mr. Sharif is well-appearing and in no apparent distress. The surgical site is a well healed pink scar.    Assessment and Plan:  History of skin cancer requiring ongoing monitoring for recurrence and additional lesion development.   The patient was reassured that the wound has healed and does not need further wound care.   Scar revision to excise the granulation scar was discussed with the patient, who declined.     The patient was advised on the importance of routine skin monitoring including follow up with general dermatology and instructed to call with any further concerns. The patient will return as needed.

## 2024-04-05 ENCOUNTER — APPOINTMENT (OUTPATIENT)
Dept: PRIMARY CARE | Facility: CLINIC | Age: 69
End: 2024-04-05
Payer: COMMERCIAL

## 2024-04-08 ENCOUNTER — TELEPHONE (OUTPATIENT)
Dept: PRIMARY CARE | Facility: CLINIC | Age: 69
End: 2024-04-08
Payer: COMMERCIAL

## 2024-04-08 NOTE — TELEPHONE ENCOUNTER
Pt called in wanting a prescription for acid reflex. I told pt he would have to be seen but he wants to take a chance and see if you would send in something for him without having to see him. He would like for you to give him a call at:    Pt number: [phone]

## 2024-04-16 ENCOUNTER — OFFICE VISIT (OUTPATIENT)
Dept: PRIMARY CARE | Facility: CLINIC | Age: 69
End: 2024-04-16
Payer: COMMERCIAL

## 2024-04-16 VITALS
DIASTOLIC BLOOD PRESSURE: 76 MMHG | HEIGHT: 71 IN | WEIGHT: 149 LBS | HEART RATE: 53 BPM | OXYGEN SATURATION: 97 % | RESPIRATION RATE: 16 BRPM | SYSTOLIC BLOOD PRESSURE: 117 MMHG | BODY MASS INDEX: 20.86 KG/M2

## 2024-04-16 DIAGNOSIS — K13.0 CHAPPED LIPS: ICD-10-CM

## 2024-04-16 DIAGNOSIS — K21.9 GASTROESOPHAGEAL REFLUX DISEASE, UNSPECIFIED WHETHER ESOPHAGITIS PRESENT: Primary | ICD-10-CM

## 2024-04-16 RX ORDER — OMEPRAZOLE 20 MG/1
20 CAPSULE, DELAYED RELEASE ORAL
Qty: 60 CAPSULE | Refills: 0 | Status: SHIPPED | OUTPATIENT
Start: 2024-04-16 | End: 2024-06-07 | Stop reason: SDUPTHER

## 2024-04-16 ASSESSMENT — ENCOUNTER SYMPTOMS
CHILLS: 0
FEVER: 0
VOMITING: 0
SHORTNESS OF BREATH: 0
ABDOMINAL PAIN: 0
NAUSEA: 0
DIARRHEA: 0
COUGH: 0

## 2024-04-16 NOTE — PATIENT INSTRUCTIONS
May continue omeprazole  Referral being placed to GI for endoscopy   Please have this scheduled  Follow up with PCP for annual physical

## 2024-04-16 NOTE — PROGRESS NOTES
"Subjective   Chief Complaint: GERD.    HPI   Wes Sharif is a 68 y.o. male who presents for GERD.    Patient presents with acid reflux, he reports that he was having     He was eating different foods and his diet changed and that's around the time that his symptoms became more frequent       When going to bed at night he feels like there is a brick in his abdomen  Eats dinner at 7 its worse  Better with smaller meals and eating earlier.   He started taking  Omeprazole that he got from his friend 1 week ago and he reports improvement in his symptoms.         Review of Systems   Constitutional:  Negative for chills and fever.   Respiratory:  Negative for cough and shortness of breath.    Cardiovascular:  Negative for chest pain.   Gastrointestinal:  Negative for abdominal pain, diarrhea, nausea and vomiting.       Objective   /76 (BP Location: Left arm, Patient Position: Sitting, BP Cuff Size: Adult)   Pulse 53   Resp 16   Ht 1.791 m (5' 10.5\")   Wt 67.6 kg (149 lb)   SpO2 97%   BMI 21.08 kg/m²   BSA Body surface area is 1.83 meters squared.      Physical Exam  Constitutional:       Appearance: Normal appearance.   Cardiovascular:      Rate and Rhythm: Normal rate and regular rhythm.   Pulmonary:      Effort: Pulmonary effort is normal.      Breath sounds: Normal breath sounds.   Abdominal:      General: Abdomen is flat.      Palpations: Abdomen is soft.   Neurological:      Mental Status: He is alert.       Procedure Visit on 02/02/2024   Component Date Value Ref Range Status    Case Report 02/02/2024    Final                    Value:Dermatopathology                                  Case: [id number]                                   Authorizing Provider:  Nando Sales MD            Collected:           02/02/2024 1427              Ordering Location:     [facility]       Received:            02/02/2024 1632              Pathologist:           Parvin Carver MD                                   "                           Specimen:    SKIN EXCISION, Left Paraspinal Lower Back                                                  FINAL DIAGNOSIS 02/02/2024    Final                    Value:This result contains rich text formatting which cannot be displayed here.      02/02/2024    Final                    Value:This result contains rich text formatting which cannot be displayed here.    Clinical History 02/02/2024    Final                    Value:This result contains rich text formatting which cannot be displayed here.    Microscopic Description 02/02/2024    Final                    Value:This result contains rich text formatting which cannot be displayed here.    Gross Description 02/02/2024    Final                    Value:This result contains rich text formatting which cannot be displayed here.   Legacy Encounter on 09/13/2023   Component Date Value Ref Range Status    Pathology Report 09/13/2023    Final                    Value:Name SHANTA GUTIÉRREZ                                                                                                   Accession #: [id number]            Pathologist:                   JESSICA HAYS MD  Date of Procedure:    9/13/2023  Date Received:          9/14/2023  Date Reported           9/15/2023  Submitting Physician:   BUSHRA SAUCEDO MD  Location:                    [facility]  Other External #                                                                    FINAL DIAGNOSIS  SKIN, L PARASPINAL LOWER BACK, SHAVE BIOPSY:  PIGMENTED BASAL CELL CARCINOMA, SUPERFICIAL AND NODULAR GROWTH PATTERN,  APPROXIMATLEY 0.2 MM FROM THE DEEP MARGIN.                  **Electronically Signed Out by JESSICA HAYS M.D.**                                                                                                                                                                                                                                                                                                                                                                                                                                                                                                                  Electronically Signed Out By JESSICA HAYS MD/[facility]  By the signature on this report, the individual or group listed as making the  Final Interpretation/Diagnosis certifies that they have reviewed this case.  Diagnostic interpretation performed at  Dermatopath Lab [address] [place] [address],  [place] [address]           Microscopic Description:  Microscopic analysis shows multifocal buds and irregular proliferations of  bland basaloid keratinocytes with palisaded arrangement of peripheral nuclei  arising from epidermis. Mucinous stroma separates the carcinoma buds from  dermis.  Skip areas are present, where normal epidermis separates focal areas  of carcinoma.     Clinical History:  1.3-cm, pigmented erythematous plaque, r/o MM v. pigmented BCC.  Shave biopsy.   ([facility])    Specimen                          s Submitted As:  A: SKIN, L PARASPINAL LOWER BACK     Gross Description:  Received in formalin is one tan-brown piece of skin measuring 11 x 10 x 2 mm.   Inked and embedded in toto.    mlz/9/14/2023               [facility]  Dermatopathology Laboratory [place] [address] [place] [address]        CONVERTED FINAL DIAGNOSIS 09/13/2023    Final                    Value:SKIN, L PARASPINAL LOWER BACK, SHAVE BIOPSY:  PIGMENTED BASAL CELL CARCINOMA, SUPERFICIAL AND NODULAR GROWTH PATTERN,  APPROXIMATLEY 0.2 MM FROM THE DEEP MARGIN.                  **Electronically Signed Out by JESSICA HAYS M.D.**        CONVERTED CLINICAL DIAGNOSIS-HISTO* 09/13/2023    Final                    Value:1.3-cm, pigmented erythematous plaque, r/o MM v. pigmented BCC.  Shave biopsy.   ([facility])      CONVERTED GROSS DESCRIPTION 09/13/2023    Final                     Value:Received in formalin is one tan-brown piece of skin measuring 11 x 10 x 2 mm.   Inked and embedded in toto.      CONVERTED MICROSCOPIC DESCRIPTION 09/13/2023    Final                    Value:Microscopic analysis shows multifocal buds and irregular proliferations of  bland basaloid keratinocytes with palisaded arrangement of peripheral nuclei  arising from epidermis. Mucinous stroma separates the carcinoma buds from  dermis.  Skip areas are present, where normal epidermis separates focal areas  of carcinoma.       CONVERTED FINAL REPORT PDF LINK TO* 09/13/2023 \\copathshare\copath\PDF 2022_[id number]   Final   Lab on 07/03/2023   Component Date Value Ref Range Status    Glucose 07/03/2023 96  74 - 99 mg/dL Final    Sodium 07/03/2023 141  136 - 145 mmol/L Final    Potassium 07/03/2023 4.1  3.5 - 5.3 mmol/L Final    Chloride 07/03/2023 102  98 - 107 mmol/L Final    Bicarbonate 07/03/2023 30  21 - 32 mmol/L Final    Anion Gap 07/03/2023 13  10 - 20 mmol/L Final    Urea Nitrogen 07/03/2023 18  6 - 23 mg/dL Final    Creatinine 07/03/2023 1.43 (H)  0.50 - 1.30 mg/dL Final    GFR MALE 07/03/2023 54 (A)  >90 mL/min/1.73m2 Final     CALCULATIONS OF ESTIMATED GFR ARE PERFORMED   USING THE 2021 CKD-EPI STUDY REFIT EQUATION   WITHOUT THE RACE VARIABLE FOR THE IDMS-TRACEABLE   CREATININE METHODS.    https://jasn.asnjournals.org/content/early/2021/09/22/ASN.1410019448    Calcium 07/03/2023 9.4  8.6 - 10.6 mg/dL Final    Albumin 07/03/2023 4.7  3.4 - 5.0 g/dL Final    Alkaline Phosphatase 07/03/2023 61  33 - 136 U/L Final    Total Protein 07/03/2023 6.9  6.4 - 8.2 g/dL Final    AST 07/03/2023 24  9 - 39 U/L Final    Total Bilirubin 07/03/2023 1.2  0.0 - 1.2 mg/dL Final    ALT (SGPT) 07/03/2023 26  10 - 52 U/L Final     Patients treated with Sulfasalazine may generate    falsely decreased results for ALT.    ALBUMIN (MG/L) IN URINE 07/03/2023 <7.0  Not Established mg/L Final    Albumin/Creatine Ratio  07/03/2023 SEE COMMENT  0.0 - 30.0 ug/mg crt Final    One or more analytes used in this calculation   is outside of the analytical measurement range.  Calculation cannot be performed.    Creatinine, Urine 07/03/2023 13.3 (L)  20.0 - 370.0 mg/dL Final    Hemoglobin A1C 07/03/2023 5.5  % Final         Diagnosis of Diabetes-Adults   Non-Diabetic: < or = 5.6%   Increased risk for developing diabetes: 5.7-6.4%   Diagnostic of diabetes: > or = 6.5%  .       Monitoring of Diabetes                Age (y)     Therapeutic Goal (%)   Adults:          >18           <7.0   Pediatrics:    13-18           <7.5                   7-12           <8.0                   0- 6            7.5-8.5   American Diabetes Association. Diabetes Care 33(S1), Jan 2010.    Estimated Average Glucose 07/03/2023 111  MG/DL Final   Lab on 06/05/2023   Component Date Value Ref Range Status    WBC 06/05/2023 6.1  4.4 - 11.3 x10E9/L Final    nRBC 06/05/2023 0.0  0.0 - 0.0 /100 WBC Final    RBC 06/05/2023 5.23  4.50 - 5.90 x10E12/L Final    Hemoglobin 06/05/2023 15.6  13.5 - 17.5 g/dL Final    Hematocrit 06/05/2023 47.7  41.0 - 52.0 % Final    MCV 06/05/2023 91  80 - 100 fL Final    MCHC 06/05/2023 32.7  32.0 - 36.0 g/dL Final    Platelets 06/05/2023 214  150 - 450 x10E9/L Final    RDW 06/05/2023 13.1  11.5 - 14.5 % Final    Neutrophils % 06/05/2023 60.4  40.0 - 80.0 % Final    Immature Granulocytes %, Automated 06/05/2023 0.2  0.0 - 0.9 % Final     Immature Granulocyte Count (IG) includes promyelocytes,    myelocytes and metamyelocytes but does not include bands.   Percent differential counts (%) should be interpreted in the   context of the absolute cell counts (cells/L).    Lymphocytes % 06/05/2023 28.9  13.0 - 44.0 % Final    Monocytes % 06/05/2023 8.4  2.0 - 10.0 % Final    Eosinophils % 06/05/2023 1.8  0.0 - 6.0 % Final    Basophils % 06/05/2023 0.3  0.0 - 2.0 % Final    Neutrophils Absolute 06/05/2023 3.69  1.20 - 7.70 x10E9/L Final    Lymphocytes  Absolute 06/05/2023 1.76  1.20 - 4.80 x10E9/L Final    Monocytes Absolute 06/05/2023 0.51  0.10 - 1.00 x10E9/L Final    Eosinophils Absolute 06/05/2023 0.11  0.00 - 0.70 x10E9/L Final    Basophils Absolute 06/05/2023 0.02  0.00 - 0.10 x10E9/L Final    PSA 06/05/2023 0.46  0.00 - 4.00 ng/mL Final    The FDA requires that the method used for PSA assay be   reported to the physician. Values obtained with different   assay methods must not be used interchangeably. This test   was performed at [facility] using the Siemens  Intellisense PSA method, which is a sandwich immunoassay using   chemiluminescence for quantitation. The assay is approved  for measurement of prostate-specific antigen (PSA) in   serum and may be used in conjunction with a digital rectal  examination in men 50 years and older as an aid in   detection of prostate cancer.   5-Alpha-reductase inhibitors (e.g. Proscar, Finasteride,   Avodart, Dutasteride and Nae) for the treatment of BPH   have been shown to lower PSA levels by an average of 50%   after 6 months of treatment.    TSH 06/05/2023 1.62  0.44 - 3.98 mIU/L Final     TSH testing is performed using different testing    methodology at [facility] than at other    [facility]. Direct result comparisons should    only be made within the same method.    Color, Urine 06/05/2023 YELLOW  STRAW,YELLOW Final    Appearance, Urine 06/05/2023 CLEAR  CLEAR Final    Specific Gravity, Urine 06/05/2023 1.017  1.005 - 1.035 Final    pH, Urine 06/05/2023 7.0  5.0 - 8.0 Final    Protein, Urine 06/05/2023 NEGATIVE  NEGATIVE mg/dL Final    Glucose, Urine 06/05/2023 NEGATIVE  NEGATIVE mg/dL Final    Blood, Urine 06/05/2023 NEGATIVE  NEGATIVE Final    Ketones, Urine 06/05/2023 NEGATIVE  NEGATIVE mg/dL Final    Bilirubin, Urine 06/05/2023 NEGATIVE  NEGATIVE Final    Urobilinogen, Urine 06/05/2023 <2.0  0.0 - 1.9 mg/dL Final    Nitrite, Urine 06/05/2023 NEGATIVE  NEGATIVE Final     Leukocyte Esterase, Urine 06/05/2023 NEGATIVE  NEGATIVE Final    Vitamin D, 25-Hydroxy 06/05/2023 34  ng/mL Final    .  DEFICIENCY:         < 20   NG/ML  INSUFFICIENCY:      20-29  NG/ML  SUFFICIENCY:         NG/ML    THIS ASSAY ACCURATELY QUANTIFIES THE SUM OF  VITAMIN D3, 25-HYDROXY AND VIT D2,25-HYDROXY.    Glucose 06/05/2023 95  74 - 99 mg/dL Final    Sodium 06/05/2023 143  136 - 145 mmol/L Final    Potassium 06/05/2023 4.5  3.5 - 5.3 mmol/L Final    Chloride 06/05/2023 106  98 - 107 mmol/L Final    Bicarbonate 06/05/2023 32  21 - 32 mmol/L Final    Anion Gap 06/05/2023 10  10 - 20 mmol/L Final    Urea Nitrogen 06/05/2023 23  6 - 23 mg/dL Final    Creatinine 06/05/2023 1.42 (H)  0.50 - 1.30 mg/dL Final    GFR MALE 06/05/2023 54 (A)  >90 mL/min/1.73m2 Final     CALCULATIONS OF ESTIMATED GFR ARE PERFORMED   USING THE 2021 CKD-EPI STUDY REFIT EQUATION   WITHOUT THE RACE VARIABLE FOR THE IDMS-TRACEABLE   CREATININE METHODS.    https://jasn.asnjournals.org/content/early/2021/09/22/ASN.9852839974    Calcium 06/05/2023 9.4  8.6 - 10.6 mg/dL Final    Albumin 06/05/2023 4.5  3.4 - 5.0 g/dL Final    Alkaline Phosphatase 06/05/2023 62  33 - 136 U/L Final    Total Protein 06/05/2023 6.7  6.4 - 8.2 g/dL Final    AST 06/05/2023 24  9 - 39 U/L Final    Total Bilirubin 06/05/2023 1.0  0.0 - 1.2 mg/dL Final    ALT (SGPT) 06/05/2023 27  10 - 52 U/L Final     Patients treated with Sulfasalazine may generate    falsely decreased results for ALT.     Current Outpatient Medications on File Prior to Visit   Medication Sig Dispense Refill    finasteride (Propecia) 1 mg tablet Take 1 tablet (1 mg) by mouth once daily. 90 tablet 1    busPIRone (Buspar) 10 mg tablet Take 0.5 tablets (5 mg) by mouth 2 times a day. (Patient not taking: Reported on 4/16/2024) 180 tablet 0    carboxymethylcell-glycerin,PF, (Refresh Relieva PF) 0.5-0.9 % drops Administer 1 drop into affected eye(s) once daily as needed (dry eyes). (Patient not taking:  Reported on 4/16/2024) 10 mL 0    erythromycin (Romycin) 5 mg/gram (0.5 %) ophthalmic ointment Apply 1 Application to both eyes twice a day.      fluticasone (Flonase) 50 mcg/actuation nasal spray PLEASE SEE ATTACHED FOR DETAILED DIRECTIONS (Patient not taking: Reported on 4/16/2024) 24 mL 2    guaiFENesin (Mucinex) 600 mg 12 hr tablet 1 tablet for chest congestion Orally every 12 hrs      omeprazole (PriLOSEC) 20 mg DR capsule Take 1 capsule (20 mg) by mouth once daily.      sodium fluoride-pot nitrate 1.1-5 % paste PLEASE SEE ATTACHED FOR DETAILED DIRECTIONS      white petrolatum (Vaseline) ointment Apply topically if needed for dry skin (chapped lips). Apply a thin layer to lips twice a day as needed. (Patient not taking: Reported on 4/16/2024) 106 g 1    [DISCONTINUED] loratadine (Claritin) 10 mg tablet Take 1 tablet (10 mg) by mouth once daily. 30 tablet 2     No current facility-administered medications on file prior to visit.     No images are attached to the encounter.            Assessment/Plan   Problem List Items Addressed This Visit             ICD-10-CM    Gastroesophageal reflux disease - Primary K21.9    Relevant Medications    omeprazole (PriLOSEC) 20 mg DR capsule    Other Relevant Orders    Referral to Gastroenterology     Other Visit Diagnoses         Codes    Chapped lips     K13.0

## 2024-05-03 ENCOUNTER — APPOINTMENT (OUTPATIENT)
Dept: PRIMARY CARE | Facility: CLINIC | Age: 69
End: 2024-05-03
Payer: COMMERCIAL

## 2024-06-07 ENCOUNTER — OFFICE VISIT (OUTPATIENT)
Dept: PRIMARY CARE | Facility: CLINIC | Age: 69
End: 2024-06-07
Payer: COMMERCIAL

## 2024-06-07 VITALS
DIASTOLIC BLOOD PRESSURE: 74 MMHG | BODY MASS INDEX: 19.6 KG/M2 | WEIGHT: 140 LBS | HEIGHT: 71 IN | HEART RATE: 63 BPM | OXYGEN SATURATION: 98 % | SYSTOLIC BLOOD PRESSURE: 114 MMHG | TEMPERATURE: 97.7 F

## 2024-06-07 DIAGNOSIS — L65.9 HAIR LOSS: ICD-10-CM

## 2024-06-07 DIAGNOSIS — Z00.00 ROUTINE GENERAL MEDICAL EXAMINATION AT HEALTH CARE FACILITY: Primary | ICD-10-CM

## 2024-06-07 DIAGNOSIS — Z87.442 HISTORY OF KIDNEY STONES: ICD-10-CM

## 2024-06-07 DIAGNOSIS — F41.9 ANXIETY: ICD-10-CM

## 2024-06-07 DIAGNOSIS — Z13.6 SCREENING FOR CARDIOVASCULAR CONDITION: ICD-10-CM

## 2024-06-07 DIAGNOSIS — K21.9 GASTROESOPHAGEAL REFLUX DISEASE, UNSPECIFIED WHETHER ESOPHAGITIS PRESENT: ICD-10-CM

## 2024-06-07 DIAGNOSIS — H04.123 DRY EYE SYNDROME OF BOTH EYES: ICD-10-CM

## 2024-06-07 DIAGNOSIS — Z12.5 PROSTATE CANCER SCREENING: ICD-10-CM

## 2024-06-07 DIAGNOSIS — Z00.00 MEDICARE ANNUAL WELLNESS VISIT, SUBSEQUENT: ICD-10-CM

## 2024-06-07 DIAGNOSIS — H02.88A MEIBOMIAN GLAND DYSFUNCTION (MGD) OF UPPER AND LOWER LIDS OF BOTH EYES: ICD-10-CM

## 2024-06-07 DIAGNOSIS — H02.88B MEIBOMIAN GLAND DYSFUNCTION (MGD) OF UPPER AND LOWER LIDS OF BOTH EYES: ICD-10-CM

## 2024-06-07 DIAGNOSIS — Z71.89 ADVANCE DIRECTIVE DISCUSSED WITH PATIENT: ICD-10-CM

## 2024-06-07 DIAGNOSIS — C44.519 BASAL CELL CARCINOMA (BCC) OF LOWER BACK: ICD-10-CM

## 2024-06-07 DIAGNOSIS — E78.00 ELEVATED LDL CHOLESTEROL LEVEL: ICD-10-CM

## 2024-06-07 PROCEDURE — 1170F FXNL STATUS ASSESSED: CPT | Performed by: STUDENT IN AN ORGANIZED HEALTH CARE EDUCATION/TRAINING PROGRAM

## 2024-06-07 PROCEDURE — 1123F ACP DISCUSS/DSCN MKR DOCD: CPT | Performed by: STUDENT IN AN ORGANIZED HEALTH CARE EDUCATION/TRAINING PROGRAM

## 2024-06-07 PROCEDURE — 99213 OFFICE O/P EST LOW 20 MIN: CPT | Performed by: STUDENT IN AN ORGANIZED HEALTH CARE EDUCATION/TRAINING PROGRAM

## 2024-06-07 PROCEDURE — 99397 PER PM REEVAL EST PAT 65+ YR: CPT | Performed by: STUDENT IN AN ORGANIZED HEALTH CARE EDUCATION/TRAINING PROGRAM

## 2024-06-07 PROCEDURE — G0439 PPPS, SUBSEQ VISIT: HCPCS | Performed by: STUDENT IN AN ORGANIZED HEALTH CARE EDUCATION/TRAINING PROGRAM

## 2024-06-07 PROCEDURE — 1159F MED LIST DOCD IN RCRD: CPT | Performed by: STUDENT IN AN ORGANIZED HEALTH CARE EDUCATION/TRAINING PROGRAM

## 2024-06-07 PROCEDURE — 99497 ADVNCD CARE PLAN 30 MIN: CPT | Performed by: STUDENT IN AN ORGANIZED HEALTH CARE EDUCATION/TRAINING PROGRAM

## 2024-06-07 PROCEDURE — 1160F RVW MEDS BY RX/DR IN RCRD: CPT | Performed by: STUDENT IN AN ORGANIZED HEALTH CARE EDUCATION/TRAINING PROGRAM

## 2024-06-07 PROCEDURE — 1158F ADVNC CARE PLAN TLK DOCD: CPT | Performed by: STUDENT IN AN ORGANIZED HEALTH CARE EDUCATION/TRAINING PROGRAM

## 2024-06-07 RX ORDER — OMEPRAZOLE 20 MG/1
20 CAPSULE, DELAYED RELEASE ORAL
Qty: 90 CAPSULE | Refills: 1 | Status: SHIPPED | OUTPATIENT
Start: 2024-06-07

## 2024-06-07 ASSESSMENT — ENCOUNTER SYMPTOMS
COUGH: 0
NAUSEA: 0
SHORTNESS OF BREATH: 0
NUMBNESS: 0
DIZZINESS: 0
VOMITING: 0
CONSTIPATION: 0
DIARRHEA: 0
MYALGIAS: 0
ABDOMINAL PAIN: 0
RHINORRHEA: 0
DYSURIA: 0
FREQUENCY: 0
LOSS OF SENSATION IN FEET: 0
FATIGUE: 0
DYSPHORIC MOOD: 0
FEVER: 0
SORE THROAT: 0
OCCASIONAL FEELINGS OF UNSTEADINESS: 0
NERVOUS/ANXIOUS: 0
CHILLS: 0
ARTHRALGIAS: 0
LIGHT-HEADEDNESS: 0
DEPRESSION: 0
COLOR CHANGE: 0
PALPITATIONS: 0

## 2024-06-07 ASSESSMENT — PATIENT HEALTH QUESTIONNAIRE - PHQ9
2. FEELING DOWN, DEPRESSED OR HOPELESS: NOT AT ALL
SUM OF ALL RESPONSES TO PHQ9 QUESTIONS 1 AND 2: 0
1. LITTLE INTEREST OR PLEASURE IN DOING THINGS: NOT AT ALL

## 2024-06-07 ASSESSMENT — ACTIVITIES OF DAILY LIVING (ADL)
BATHING: INDEPENDENT
TAKING_MEDICATION: INDEPENDENT
MANAGING_FINANCES: INDEPENDENT
DRESSING: INDEPENDENT
GROCERY_SHOPPING: INDEPENDENT
DOING_HOUSEWORK: INDEPENDENT

## 2024-06-07 NOTE — PATIENT INSTRUCTIONS
Thank you for coming in for your Medicare wellness visit.    I went ahead and ordered your lab work.  Please make sure you are fasting for about 10 hours before having the lab work done.  We will contact you back with those results.  I would like you to try to get your labs done in the next 1 to 2 weeks.    I also placed an order for CT cardiac score.  They should be calling you within the next week to get this set up.  They probably will be scheduling out into August.  You can get this set up for when you are next back in the country after your trip to [place].    Please continue with regular follow-up with your dermatologist as well as with urology and your eye doctor.    Please also keep following up with your gastroenterologist, Dr. Rincon as it looks like they are planning on getting more testing ordered for you.  We can follow-up with you after the testing if you need to.    You can always bring in the bottle of the fish oil supplement that you about so that we can check the dosing.  You could also take a picture of the back of the bottle and send it to me a message within the [employer].    Please keep up with healthy diet and exercise.    Please get set up for a follow-up visit with me in the next 6 months.  This can get set up before you leave today.    Please call with any other questions or concerns.    Thank you

## 2024-06-07 NOTE — PROGRESS NOTES
Subjective   Reason for Visit: Wes Sharif is an 68 y.o. male here for a Medicare Wellness visit.     Past Medical, Surgical, and Family History reviewed and updated in chart.    Reviewed all medications by prescribing practitioner or clinical pharmacist (such as prescriptions, OTCs, herbal therapies and supplements) and documented in the medical record.    HPI    Following with dermatology with Dr. Howard.    He is following with an ophthalmologist for dry eye syndrome. This is with Dr. Kilgore    He also was following with urology for a kidney stone that he had to have removed. This was in March 2025.  They will be following with him in 6 months.    Dental: 3-4 times a year.  Vision: Glasses. Regular appointments with the ophthalmologist every 3-4 months.    Last Colonoscopy: 11/01/2022 [place]. He is following with Dr. Rincon. They are planning both the colonoscopy and he thinks also an EGD.  AAA Screening: Not indicated  Low Dose Lung CT Screening: Not indicated.    Influenza: Up to date.  Tetanus: No recent injury. Last done 6-7 years.  Pneumonia: Complete.  COVID: Recommended updated vaccine.  Shingles: Recommended   RSV: Complete.      Patient Care Team:  Taj Gee DO as PCP - General (Family Medicine)  Taj Gee DO as PCP - [facility] PCP     Review of Systems   Constitutional:  Negative for chills, fatigue and fever.   HENT:  Negative for congestion, rhinorrhea and sore throat.    Eyes:  Negative for visual disturbance.   Respiratory:  Negative for cough and shortness of breath.    Cardiovascular:  Negative for chest pain and palpitations.   Gastrointestinal:  Negative for abdominal pain, constipation, diarrhea, nausea and vomiting.   Genitourinary:  Negative for dysuria and frequency.   Musculoskeletal:  Negative for arthralgias and myalgias.   Skin:  Negative for color change and rash.   Neurological:  Negative for dizziness, light-headedness and numbness.  "  Psychiatric/Behavioral:  Negative for dysphoric mood. The patient is not nervous/anxious.        Objective   Vitals:  /74   Pulse 63   Temp 36.5 °C (97.7 °F)   Ht 1.803 m (5' 11\")   Wt 63.5 kg (140 lb)   SpO2 98%   BMI 19.53 kg/m²       Physical Exam  Vitals and nursing note reviewed.   Constitutional:       General: He is not in acute distress.     Appearance: Normal appearance. He is normal weight. He is not ill-appearing or toxic-appearing.   HENT:      Head: Normocephalic and atraumatic.      Right Ear: Tympanic membrane, ear canal and external ear normal.      Left Ear: Tympanic membrane, ear canal and external ear normal.      Nose: Nose normal.      Mouth/Throat:      Mouth: Mucous membranes are moist.      Pharynx: Oropharynx is clear.   Eyes:      Extraocular Movements: Extraocular movements intact.      Conjunctiva/sclera: Conjunctivae normal.      Pupils: Pupils are equal, round, and reactive to light.   Cardiovascular:      Rate and Rhythm: Normal rate and regular rhythm.      Pulses: Normal pulses.      Heart sounds: Normal heart sounds.   Pulmonary:      Effort: Pulmonary effort is normal.      Breath sounds: Normal breath sounds.   Abdominal:      General: Abdomen is flat. Bowel sounds are normal.      Palpations: Abdomen is soft.   Musculoskeletal:         General: Normal range of motion.      Cervical back: Normal range of motion and neck supple.   Skin:     General: Skin is warm and dry.   Neurological:      General: No focal deficit present.      Mental Status: He is alert and oriented to person, place, and time. Mental status is at baseline.      Cranial Nerves: No cranial nerve deficit.      Sensory: No sensory deficit.      Motor: No weakness.   Psychiatric:         Mood and Affect: Mood normal.         Behavior: Behavior normal.         Thought Content: Thought content normal.         Judgment: Judgment normal.         Assessment/Plan   Problem List Items Addressed This Visit  "      Anxiety    Overview     Chronic. Stable.         Relevant Orders    CBC and Auto Differential    Comprehensive Metabolic Panel    Vitamin D 25-Hydroxy,Total (for eval of Vitamin D levels)    Gastroesophageal reflux disease    Overview     Chronic. Stable. Well controlled on omeprazole. Following with Dr. Rincon.         Relevant Medications    omeprazole (PriLOSEC) 20 mg DR capsule    Other Relevant Orders    CBC and Auto Differential    Comprehensive Metabolic Panel    Hair loss    Overview     Chronic. Stable. Well controlled on finasteride.         Basal cell carcinoma (BCC) of lower back    Overview     Following with dermatology with Dr. Sales. Had removed on 2/02/2024.         Relevant Orders    CBC and Auto Differential    TSH with reflex to Free T4 if abnormal    History of kidney stones    Overview     Right sided. March 2024. Following with urology. Next appointment in 6 months.         Relevant Orders    CBC and Auto Differential    Comprehensive Metabolic Panel    TSH with reflex to Free T4 if abnormal    Vitamin D 25-Hydroxy,Total (for eval of Vitamin D levels)    Dry eye syndrome of both eyes    Overview     Following with ophthalmology with the [facility] in [place] with Dr. Kilgore.         Meibomian gland dysfunction (MGD) of upper and lower lids of both eyes    Overview     Following with ophthalmology with the [facility] in [place] with Dr. Kilgore.          Other Visit Diagnoses       Medicare annual wellness visit, subsequent    -  Primary    Advance directive discussed with patient        Elevated LDL cholesterol level        Relevant Orders    CBC and Auto Differential    Comprehensive Metabolic Panel    Lipid Panel    TSH with reflex to Free T4 if abnormal    Prostate cancer screening        Relevant Orders    Prostate Specific Antigen

## 2024-06-07 NOTE — ACP (ADVANCE CARE PLANNING)
Confirming Previous Code Status:   Advance Care Planning Note     Discussion Date: 06/07/24   Discussion Participants: patient    The patient wishes to discuss Advance Care Planning today and the following is a brief summary of our discussion.     Patient has capacity to make their own medical decisions: Yes  Health Care Agent/Surrogate Decision Maker documented in chart: Yes    Documents on file and valid:  Advance Directive/Living Will: No Has the paperwork and will be working on this at home.  Health Care Power of : No Has the paperwork and will be working on this at home.  Other: Sister: Ariadne Bolden    Communication of Medical Status/Prognosis:   Good     Communication of Treatment Goals/Options:   Quality of life     Treatment Decisions  Goals of Care: survival is prioritized, if goals for quality or survival can reasonably be achieved     Follow Up Plan  Yearly and as needed    Team Members  Patient, sister, PCP, Attending physician    Time Statement: Total face to face time spent on advance care planning was 16 minutes with 6 minutes spent in counseling, including the explanation.    Taj Gee DO  6/7/2024 12:17 PM

## 2024-06-10 ENCOUNTER — LAB (OUTPATIENT)
Dept: LAB | Facility: LAB | Age: 69
End: 2024-06-10
Payer: COMMERCIAL

## 2024-06-10 DIAGNOSIS — Z87.442 HISTORY OF KIDNEY STONES: ICD-10-CM

## 2024-06-10 DIAGNOSIS — F41.9 ANXIETY: ICD-10-CM

## 2024-06-10 DIAGNOSIS — E78.00 ELEVATED LDL CHOLESTEROL LEVEL: ICD-10-CM

## 2024-06-10 DIAGNOSIS — C44.519 BASAL CELL CARCINOMA (BCC) OF LOWER BACK: ICD-10-CM

## 2024-06-10 DIAGNOSIS — Z12.5 PROSTATE CANCER SCREENING: ICD-10-CM

## 2024-06-10 DIAGNOSIS — K21.9 GASTROESOPHAGEAL REFLUX DISEASE, UNSPECIFIED WHETHER ESOPHAGITIS PRESENT: ICD-10-CM

## 2024-06-10 LAB
25(OH)D3 SERPL-MCNC: 44 NG/ML (ref 30–100)
ALBUMIN SERPL BCP-MCNC: 4.9 G/DL (ref 3.4–5)
ALP SERPL-CCNC: 55 U/L (ref 33–136)
ALT SERPL W P-5'-P-CCNC: 16 U/L (ref 10–52)
ANION GAP SERPL CALC-SCNC: 15 MMOL/L (ref 10–20)
AST SERPL W P-5'-P-CCNC: 18 U/L (ref 9–39)
BASOPHILS # BLD AUTO: 0.03 X10*3/UL (ref 0–0.1)
BASOPHILS NFR BLD AUTO: 0.6 %
BILIRUB SERPL-MCNC: 1.2 MG/DL (ref 0–1.2)
BUN SERPL-MCNC: 21 MG/DL (ref 6–23)
CALCIUM SERPL-MCNC: 9.7 MG/DL (ref 8.6–10.6)
CHLORIDE SERPL-SCNC: 102 MMOL/L (ref 98–107)
CHOLEST SERPL-MCNC: 200 MG/DL (ref 0–199)
CHOLESTEROL/HDL RATIO: 3.4
CO2 SERPL-SCNC: 27 MMOL/L (ref 21–32)
CREAT SERPL-MCNC: 1.3 MG/DL (ref 0.5–1.3)
EGFRCR SERPLBLD CKD-EPI 2021: 60 ML/MIN/1.73M*2
EOSINOPHIL # BLD AUTO: 0.08 X10*3/UL (ref 0–0.7)
EOSINOPHIL NFR BLD AUTO: 1.5 %
ERYTHROCYTE [DISTWIDTH] IN BLOOD BY AUTOMATED COUNT: 12.9 % (ref 11.5–14.5)
GLUCOSE SERPL-MCNC: 100 MG/DL (ref 74–99)
HCT VFR BLD AUTO: 45 % (ref 41–52)
HDLC SERPL-MCNC: 58.8 MG/DL
HGB BLD-MCNC: 15.3 G/DL (ref 13.5–17.5)
IMM GRANULOCYTES # BLD AUTO: 0.01 X10*3/UL (ref 0–0.7)
IMM GRANULOCYTES NFR BLD AUTO: 0.2 % (ref 0–0.9)
LDLC SERPL CALC-MCNC: 126 MG/DL
LYMPHOCYTES # BLD AUTO: 1.56 X10*3/UL (ref 1.2–4.8)
LYMPHOCYTES NFR BLD AUTO: 30.2 %
MCH RBC QN AUTO: 30.2 PG (ref 26–34)
MCHC RBC AUTO-ENTMCNC: 34 G/DL (ref 32–36)
MCV RBC AUTO: 89 FL (ref 80–100)
MONOCYTES # BLD AUTO: 0.38 X10*3/UL (ref 0.1–1)
MONOCYTES NFR BLD AUTO: 7.4 %
NEUTROPHILS # BLD AUTO: 3.11 X10*3/UL (ref 1.2–7.7)
NEUTROPHILS NFR BLD AUTO: 60.1 %
NON HDL CHOLESTEROL: 141 MG/DL (ref 0–149)
NRBC BLD-RTO: 0 /100 WBCS (ref 0–0)
PLATELET # BLD AUTO: 216 X10*3/UL (ref 150–450)
POTASSIUM SERPL-SCNC: 4.4 MMOL/L (ref 3.5–5.3)
PROT SERPL-MCNC: 6.6 G/DL (ref 6.4–8.2)
PSA SERPL-MCNC: 0.6 NG/ML
RBC # BLD AUTO: 5.07 X10*6/UL (ref 4.5–5.9)
SODIUM SERPL-SCNC: 140 MMOL/L (ref 136–145)
TRIGL SERPL-MCNC: 74 MG/DL (ref 0–149)
TSH SERPL-ACNC: 1.24 MIU/L (ref 0.44–3.98)
VLDL: 15 MG/DL (ref 0–40)
WBC # BLD AUTO: 5.2 X10*3/UL (ref 4.4–11.3)

## 2024-06-10 PROCEDURE — 84443 ASSAY THYROID STIM HORMONE: CPT

## 2024-06-10 PROCEDURE — 80053 COMPREHEN METABOLIC PANEL: CPT

## 2024-06-10 PROCEDURE — 85025 COMPLETE CBC W/AUTO DIFF WBC: CPT

## 2024-06-10 PROCEDURE — 82306 VITAMIN D 25 HYDROXY: CPT

## 2024-06-10 PROCEDURE — 80061 LIPID PANEL: CPT

## 2024-06-10 PROCEDURE — 36415 COLL VENOUS BLD VENIPUNCTURE: CPT

## 2024-06-10 PROCEDURE — G0103 PSA SCREENING: HCPCS

## 2024-07-02 ENCOUNTER — TELEPHONE (OUTPATIENT)
Dept: PRIMARY CARE | Facility: CLINIC | Age: 69
End: 2024-07-02
Payer: COMMERCIAL

## 2024-07-02 DIAGNOSIS — J30.2 SEASONAL ALLERGIES: Primary | ICD-10-CM

## 2024-07-02 NOTE — TELEPHONE ENCOUNTER
Pt is requesting something to help with his allergies.  Pt states his nose gets plugged up at  night and he gets a headache.    [facility] # [phone]

## 2024-07-03 RX ORDER — CETIRIZINE HYDROCHLORIDE 10 MG/1
10 TABLET ORAL DAILY
Qty: 90 TABLET | Refills: 1 | Status: SHIPPED | OUTPATIENT
Start: 2024-07-03 | End: 2024-12-30

## 2024-07-03 RX ORDER — CETIRIZINE HYDROCHLORIDE 10 MG/1
10 TABLET ORAL DAILY
Qty: 90 TABLET | Refills: 1 | Status: SHIPPED | OUTPATIENT
Start: 2024-07-03 | End: 2024-07-03 | Stop reason: SDUPTHER

## 2024-07-11 ENCOUNTER — TELEPHONE (OUTPATIENT)
Dept: PRIMARY CARE | Facility: CLINIC | Age: 69
End: 2024-07-11
Payer: COMMERCIAL

## 2024-07-11 DIAGNOSIS — L65.9 HAIR LOSS: ICD-10-CM

## 2024-07-11 NOTE — TELEPHONE ENCOUNTER
Pt called in requesting a refill on:    finasteride (Propecia) 1 mg tablet   Taken once a day daily  Quantity: 90    [id number] - [place], [place] - [address] [place] [address] [facility] [address]  Phone: [phone]  Fax: [phone]

## 2024-07-12 RX ORDER — FINASTERIDE 1 MG/1
1 TABLET, FILM COATED ORAL DAILY
Qty: 90 TABLET | Refills: 1 | Status: SHIPPED | OUTPATIENT
Start: 2024-07-12

## 2024-07-23 ENCOUNTER — APPOINTMENT (OUTPATIENT)
Dept: PRIMARY CARE | Facility: CLINIC | Age: 69
End: 2024-07-23
Payer: COMMERCIAL

## 2024-09-12 ENCOUNTER — TELEPHONE (OUTPATIENT)
Dept: PRIMARY CARE | Facility: CLINIC | Age: 69
End: 2024-09-12
Payer: COMMERCIAL

## 2024-09-12 DIAGNOSIS — L65.9 HAIR LOSS: ICD-10-CM

## 2024-09-12 NOTE — TELEPHONE ENCOUNTER
Pt called in stating he is out of medication and needs a refill on:    finasteride (Propecia) 1 mg tablet    Take 1 tablet (1 mg) by mouth once daily.   Quantity: 90 tablet     [facility] [place] #[id number]. [id number]  Phone number: [phone]

## 2024-09-13 RX ORDER — FINASTERIDE 1 MG/1
1 TABLET, FILM COATED ORAL DAILY
Qty: 90 TABLET | Refills: 1 | Status: SHIPPED | OUTPATIENT
Start: 2024-09-13

## 2024-09-16 ENCOUNTER — APPOINTMENT (OUTPATIENT)
Dept: DERMATOLOGY | Facility: CLINIC | Age: 69
End: 2024-09-16
Payer: COMMERCIAL

## 2024-10-15 ENCOUNTER — APPOINTMENT (OUTPATIENT)
Dept: RADIOLOGY | Facility: CLINIC | Age: 69
End: 2024-10-15

## 2024-12-06 ENCOUNTER — APPOINTMENT (OUTPATIENT)
Dept: PRIMARY CARE | Facility: CLINIC | Age: 69
End: 2024-12-06
Payer: COMMERCIAL

## 2025-01-24 DIAGNOSIS — L65.9 HAIR LOSS: ICD-10-CM

## 2025-01-27 RX ORDER — FINASTERIDE 1 MG/1
1 TABLET, FILM COATED ORAL DAILY
Qty: 90 TABLET | Refills: 1 | Status: SHIPPED | OUTPATIENT
Start: 2025-01-27

## 2025-01-30 ENCOUNTER — TELEPHONE (OUTPATIENT)
Dept: DERMATOLOGY | Facility: CLINIC | Age: 70
End: 2025-01-30

## 2025-01-30 NOTE — TELEPHONE ENCOUNTER
Pt contacted office requesting appointment due to Dr. Sales letting him know he should have skin exams.  PAR scheduled patient with Dr. Mariano in May of 2025, pt is agreeable with this.    Omar Hopkins LPN

## 2025-02-06 ENCOUNTER — APPOINTMENT (OUTPATIENT)
Dept: PRIMARY CARE | Facility: CLINIC | Age: 70
End: 2025-02-06

## 2025-02-06 VITALS
SYSTOLIC BLOOD PRESSURE: 110 MMHG | WEIGHT: 143.6 LBS | DIASTOLIC BLOOD PRESSURE: 64 MMHG | BODY MASS INDEX: 20.03 KG/M2 | HEART RATE: 50 BPM

## 2025-02-06 DIAGNOSIS — Z13.6 SCREENING FOR CARDIOVASCULAR CONDITION: ICD-10-CM

## 2025-02-06 DIAGNOSIS — H91.91 HEARING LOSS OF RIGHT EAR, UNSPECIFIED HEARING LOSS TYPE: ICD-10-CM

## 2025-02-06 DIAGNOSIS — E78.00 ELEVATED LDL CHOLESTEROL LEVEL: ICD-10-CM

## 2025-02-06 DIAGNOSIS — K21.9 GASTROESOPHAGEAL REFLUX DISEASE, UNSPECIFIED WHETHER ESOPHAGITIS PRESENT: Primary | ICD-10-CM

## 2025-02-06 PROCEDURE — 1160F RVW MEDS BY RX/DR IN RCRD: CPT | Performed by: STUDENT IN AN ORGANIZED HEALTH CARE EDUCATION/TRAINING PROGRAM

## 2025-02-06 PROCEDURE — 1123F ACP DISCUSS/DSCN MKR DOCD: CPT | Performed by: STUDENT IN AN ORGANIZED HEALTH CARE EDUCATION/TRAINING PROGRAM

## 2025-02-06 PROCEDURE — 99214 OFFICE O/P EST MOD 30 MIN: CPT | Performed by: STUDENT IN AN ORGANIZED HEALTH CARE EDUCATION/TRAINING PROGRAM

## 2025-02-06 PROCEDURE — 1159F MED LIST DOCD IN RCRD: CPT | Performed by: STUDENT IN AN ORGANIZED HEALTH CARE EDUCATION/TRAINING PROGRAM

## 2025-02-06 RX ORDER — FAMOTIDINE 40 MG/1
40 TABLET, FILM COATED ORAL DAILY
Qty: 30 TABLET | Refills: 0 | Status: SHIPPED | OUTPATIENT
Start: 2025-02-06 | End: 2025-03-08

## 2025-02-06 RX ORDER — SUCRALFATE 1 G/1
1 TABLET ORAL NIGHTLY PRN
Qty: 14 TABLET | Refills: 0 | Status: SHIPPED | OUTPATIENT
Start: 2025-02-06

## 2025-02-06 ASSESSMENT — ENCOUNTER SYMPTOMS
VOMITING: 0
CONSTIPATION: 0
DIARRHEA: 0
FATIGUE: 0
RHINORRHEA: 0
FEVER: 0
ABDOMINAL PAIN: 0
SHORTNESS OF BREATH: 0
CHILLS: 0
COUGH: 0
NAUSEA: 0

## 2025-02-06 ASSESSMENT — PATIENT HEALTH QUESTIONNAIRE - PHQ9
10. IF YOU CHECKED OFF ANY PROBLEMS, HOW DIFFICULT HAVE THESE PROBLEMS MADE IT FOR YOU TO DO YOUR WORK, TAKE CARE OF THINGS AT HOME, OR GET ALONG WITH OTHER PEOPLE: NOT DIFFICULT AT ALL
SUM OF ALL RESPONSES TO PHQ9 QUESTIONS 1 AND 2: 0
2. FEELING DOWN, DEPRESSED OR HOPELESS: NOT AT ALL
1. LITTLE INTEREST OR PLEASURE IN DOING THINGS: NOT AT ALL

## 2025-02-06 NOTE — PATIENT INSTRUCTIONS
Thank you for coming in for follow-up.    I went ahead and reordered the CT cardiac scoring for you.  You should be able to get this scheduled over [employer].  Be aware that they are scheduling out a couple of months.  If you are having any issues getting scheduled, please let us know and we can try to assist with that.    For your ear, I did put in a referral to an audiologist to try to have your hearing tested.  Please let me know if you do not hear back from them within the next 1 to 2 weeks to get scheduled.    For your gastric reflux, there is a couple things that we can try.  I sent in a prescription called famotidine that you can take daily preferably in the evening either before or after your dinner to try to see if this helps to keep away reflux overnight.  As we discussed, we can try some sucralfate which you can take prior to your last meal of the day which helps to coat the stomach against any issues.  I do not think of the last all throughout the night but as I said we can go ahead and give this 1 a shot.    Please get set up for a Medicare wellness examination with me in either June or July of this year.  This can be set up before you leave today.    Thank you

## 2025-02-06 NOTE — PROGRESS NOTES
Subjective   Patient ID: Wes Sharif is a 69 y.o. male who presents for Follow-up.    HPI     He has had reflux for a long time.  His last EGD was last year in July 2024.  He stopped the omeprazole for a while for months, but his reflux has flared up again and started again.  He also is now eating later than he has in the past which he thinks is definitely contributing to increased symptoms.  He would like to know if there is other medications that he could take to try to treat his gastric reflux symptoms especially if there is something that could potentially coat his stomach or at least just reduce acid.  He will also be working at modifying when he is eating and what he is eating for overall dietary control.    He also has some hearing loss in the right ear and is interested in seeing an audiologist.      Review of Systems   Constitutional:  Negative for chills, fatigue and fever.   HENT:  Negative for congestion and rhinorrhea.    Respiratory:  Negative for cough and shortness of breath.    Cardiovascular:  Negative for chest pain.   Gastrointestinal:  Negative for abdominal pain, constipation, diarrhea, nausea and vomiting.       Objective   /64 (BP Location: Left arm, Patient Position: Sitting)   Pulse 50   Wt 65.1 kg (143 lb 9.6 oz)   BMI 20.03 kg/m²     Physical Exam  Vitals and nursing note reviewed.   Constitutional:       General: He is not in acute distress.     Appearance: Normal appearance. He is normal weight. He is not ill-appearing or toxic-appearing.   HENT:      Head: Normocephalic and atraumatic.   Cardiovascular:      Rate and Rhythm: Normal rate and regular rhythm.      Heart sounds: Normal heart sounds.   Pulmonary:      Effort: Pulmonary effort is normal.      Breath sounds: Normal breath sounds.   Abdominal:      General: Bowel sounds are normal.      Palpations: Abdomen is soft.   Neurological:      Mental Status: He is alert.         Assessment/Plan   Problem List Items  Addressed This Visit             ICD-10-CM    Gastroesophageal reflux disease - Primary K21.9    Relevant Medications    famotidine (Pepcid) 40 mg tablet    sucralfate (Carafate) 1 gram tablet     Other Visit Diagnoses         Codes    Screening for cardiovascular condition     Z13.6    Relevant Orders    CT cardiac scoring wo IV contrast    Elevated LDL cholesterol level     E78.00    Relevant Orders    CT cardiac scoring wo IV contrast    Hearing loss of right ear, unspecified hearing loss type     H91.91    Relevant Orders    Referral to Audiology          Patient coming in for overall follow-up.  Discussed gastric reflux and we discussed a couple different options we can try instead of the omeprazole.  He will try to make sure that he is eating earlier in the night to try to relieve and help with some of his symptoms.  We did discuss medication using famotidine trying to take in the evening either before or after dinner to see if this helps with reflux overnight.  Did discuss that we can do a trial of some sucralfate to see if they can help with some of his symptoms but did discuss that this is typically shorter acting.  Did go ahead and refer to audiology for some hearing loss of the right ear.  Also placed CT cardiac scoring for the patient given increase in LDL cholesterol and his age and screening for any other cardiovascular conditions.  Plan for Medicare wellness examination either in June or July 2025 and this will be set up for the patient.

## 2025-03-14 DIAGNOSIS — K21.9 GASTROESOPHAGEAL REFLUX DISEASE, UNSPECIFIED WHETHER ESOPHAGITIS PRESENT: ICD-10-CM

## 2025-03-14 RX ORDER — FAMOTIDINE 40 MG/1
40 TABLET, FILM COATED ORAL DAILY
Qty: 90 TABLET | Refills: 0 | Status: SHIPPED | OUTPATIENT
Start: 2025-03-14 | End: 2025-06-12

## 2025-03-14 RX ORDER — OMEPRAZOLE 20 MG/1
20 CAPSULE, DELAYED RELEASE ORAL
Qty: 90 CAPSULE | Refills: 0 | Status: SHIPPED | OUTPATIENT
Start: 2025-03-14

## 2025-03-21 ENCOUNTER — OFFICE VISIT (OUTPATIENT)
Dept: DERMATOLOGY | Facility: CLINIC | Age: 70
End: 2025-03-21
Payer: MEDICARE

## 2025-03-21 DIAGNOSIS — L81.4 LENTIGO: ICD-10-CM

## 2025-03-21 DIAGNOSIS — L82.1 SEBORRHEIC KERATOSIS: ICD-10-CM

## 2025-03-21 DIAGNOSIS — Z12.83 SCREENING EXAM FOR SKIN CANCER: Primary | ICD-10-CM

## 2025-03-21 DIAGNOSIS — Z12.83 ENCOUNTER FOR SCREENING FOR MALIGNANT NEOPLASM OF SKIN: ICD-10-CM

## 2025-03-21 DIAGNOSIS — D22.9 MELANOCYTIC NEVUS, UNSPECIFIED LOCATION: ICD-10-CM

## 2025-03-21 DIAGNOSIS — D18.01 HEMANGIOMA OF SKIN: ICD-10-CM

## 2025-03-21 PROCEDURE — G2211 COMPLEX E/M VISIT ADD ON: HCPCS | Performed by: NURSE PRACTITIONER

## 2025-03-21 PROCEDURE — 1160F RVW MEDS BY RX/DR IN RCRD: CPT | Performed by: NURSE PRACTITIONER

## 2025-03-21 PROCEDURE — 1159F MED LIST DOCD IN RCRD: CPT | Performed by: NURSE PRACTITIONER

## 2025-03-21 PROCEDURE — 1036F TOBACCO NON-USER: CPT | Performed by: NURSE PRACTITIONER

## 2025-03-21 PROCEDURE — 1123F ACP DISCUSS/DSCN MKR DOCD: CPT | Performed by: NURSE PRACTITIONER

## 2025-03-21 PROCEDURE — 99213 OFFICE O/P EST LOW 20 MIN: CPT | Performed by: NURSE PRACTITIONER

## 2025-03-21 NOTE — PROGRESS NOTES
Vianca Sharif is a 69 y.o. male who presents for the following: Skin Check (Pt presents to office for full skin exam.  Last full skin exam 09/2023.  Pt has history of Basal Cell Carcinoma.  Pt has scattered lesions of concern at this time. Chaperone offered and declined.//).     Review of Systems:  No other skin or systemic complaints other than what is documented elsewhere in the note.    The following portions of the chart were reviewed this encounter and updated as appropriate:  Tobacco  Allergies  Meds  Problems  Med Hx  Surg Hx  Fam Hx         Skin Cancer History  No skin cancer on file.      Specialty Problems          Dermatology Problems    Hair loss     Chronic. Stable. Well controlled on finasteride.         Basal cell carcinoma (BCC) of lower back     Following with dermatology with Dr. Sales. Had removed on 2/02/2024.             Objective   Well appearing patient in no apparent distress; mood and affect are within normal limits.    A full examination was performed including scalp, head, eyes, ears, nose, lips, neck, chest, axillae, abdomen, back, buttocks, bilateral upper extremities, bilateral lower extremities, hands, feet, fingers, toes, fingernails, and toenails. All findings within normal limits unless otherwise noted below.    Assessment/Plan   1. Screening exam for skin cancer  Scattered benign lesions. Scar(s) clear no sign of recurrence.     No evidence of recurrence in scar and benign ROS.   Continue with regular total body skin exams.  ABCDEs of melanoma and atypical moles were discussed with the patient.  Patient was instructed to perform monthly self skin examination.  We recommended that the patient have regular full skin exams given an increased risk of subsequent skin cancers.  The patient was instructed to use sun protective behaviors including use of broad spectrum sunscreens and sun protective clothing to reduce risk of skin cancers.    2. Encounter for  screening for malignant neoplasm of skin    Related Procedures  Follow Up In Dermatology - Established Patient    3. Melanocytic nevus, unspecified location  Uniform pigmented macule(s)/papule(s) with reassuring findings on dermoscopy    -Discussed nature of condition  -Reassurance, benign-appearing features on examination today  -Recommend continued observation    4. Seborrheic keratosis  Stuck on verrucous, tan-brown papules and plaques.      Although Seborrheic Keratoses can be troublesome and unsightly, they are entirely benign.  Removal of Seborrheic Keratoses is considered a cosmetic procedure. Removal is typically performed using liquid nitrogen cryotherapy.  Treatment of current lesions does not prevent the development of new Seborrheic Keratoses in the future.    5. Hemangioma of skin  Violaceous/red papule with maroon lagoons     - A cherry hemangioma is a small macule (small, flat, smooth area) or papule (small, solid bump) formed from an overgrowth of tiny blood vessels in the skin. Cherry hemangiomas are characteristically red or purplish in color. They often first appear in middle adulthood and usually increase in number with age. Cherry hemangiomas are noncancerous (benign) and are common in adults.  - Lesions are benign, reassured patient.     6. Lentigo  Scattered tan macules in sun-exposed areas.    A solar lentigo (plural, solar lentigines), sometimes called an age spot or liver spot, is a brown macule (small, flat, smooth area of skin) caused by chronic sun or artificial ultraviolet (UV) light exposure. There may be just one lentigo or there may be multiple. This type of lentigo is different from lentigo simplex (discussed separately) because it is caused by exposure to UV light. Solar lentigines are benign, but they do indicate excessive sun exposure, a risk factor for the development of skin cancer.  Lesions are benign, no treatment needed.     Follow up in 12 months for a total body skin exam.  Please call me if there are any changes or development of concerning symptoms (lesion/skin condition is changing, bleeding, enlarging, or worsening).

## 2025-04-17 ENCOUNTER — TELEPHONE (OUTPATIENT)
Dept: PRIMARY CARE | Facility: CLINIC | Age: 70
End: 2025-04-17
Payer: MEDICARE

## 2025-04-17 DIAGNOSIS — L65.9 HAIR LOSS: ICD-10-CM

## 2025-04-17 NOTE — TELEPHONE ENCOUNTER
Patient called requesting a refill on Finasteride 1 mg sent to [facility] for a 90 day supply please advise    done

## 2025-04-21 RX ORDER — FINASTERIDE 1 MG/1
1 TABLET, FILM COATED ORAL DAILY
Qty: 90 TABLET | Refills: 1 | Status: SHIPPED | OUTPATIENT
Start: 2025-04-21

## 2025-05-28 ENCOUNTER — APPOINTMENT (OUTPATIENT)
Dept: DERMATOLOGY | Facility: CLINIC | Age: 70
End: 2025-05-28

## 2025-07-09 ENCOUNTER — APPOINTMENT (OUTPATIENT)
Dept: AUDIOLOGY | Facility: CLINIC | Age: 70
End: 2025-07-09
Payer: MEDICARE

## 2025-08-22 ENCOUNTER — APPOINTMENT (OUTPATIENT)
Dept: RADIOLOGY | Facility: CLINIC | Age: 70
End: 2025-08-22
Payer: MEDICARE

## 2026-03-25 ENCOUNTER — APPOINTMENT (OUTPATIENT)
Dept: DERMATOLOGY | Facility: CLINIC | Age: 71
End: 2026-03-25
Payer: MEDICARE